# Patient Record
Sex: MALE | Race: WHITE | NOT HISPANIC OR LATINO | Employment: UNEMPLOYED | URBAN - METROPOLITAN AREA
[De-identification: names, ages, dates, MRNs, and addresses within clinical notes are randomized per-mention and may not be internally consistent; named-entity substitution may affect disease eponyms.]

---

## 2023-07-13 ENCOUNTER — APPOINTMENT (OUTPATIENT)
Dept: PRIMARY CARE | Facility: CLINIC | Age: 38
End: 2023-07-13
Payer: COMMERCIAL

## 2023-09-12 PROBLEM — R10.33 UMBILICAL PAIN: Status: ACTIVE | Noted: 2023-09-12

## 2023-09-12 PROBLEM — K42.9 UMBILICAL HERNIA: Status: ACTIVE | Noted: 2023-09-12

## 2023-09-12 PROBLEM — H52.213 IRREGULAR ASTIGMATISM OF BOTH EYES: Status: ACTIVE | Noted: 2023-09-12

## 2023-09-12 PROBLEM — R00.0 TACHYCARDIA: Status: ACTIVE | Noted: 2023-09-12

## 2023-09-12 PROBLEM — K64.9 HEMORRHOID: Status: ACTIVE | Noted: 2023-09-12

## 2023-09-12 PROBLEM — H02.821 CYST OF RIGHT UPPER EYELID: Status: ACTIVE | Noted: 2023-09-12

## 2023-09-12 PROBLEM — H18.613 KERATOCONUS, STABLE, BILATERAL: Status: ACTIVE | Noted: 2023-09-12

## 2023-09-12 PROBLEM — G43.909 MIGRAINE HEADACHE: Status: ACTIVE | Noted: 2023-09-12

## 2023-09-12 PROBLEM — K21.9 GERD WITHOUT ESOPHAGITIS: Status: ACTIVE | Noted: 2023-09-12

## 2023-09-12 PROBLEM — H18.622: Status: ACTIVE | Noted: 2023-09-12

## 2023-09-12 PROBLEM — Z87.19 HISTORY OF DIVERTICULITIS: Status: ACTIVE | Noted: 2023-09-12

## 2023-09-12 PROBLEM — Q87.2: Status: ACTIVE | Noted: 2023-09-12

## 2023-09-12 PROBLEM — H52.13 BILATERAL MYOPIA: Status: ACTIVE | Noted: 2023-09-12

## 2023-09-12 PROBLEM — E78.5 HYPERLIPIDEMIA: Status: ACTIVE | Noted: 2023-09-12

## 2023-09-12 PROBLEM — R03.0 ELEVATED BLOOD PRESSURE READING WITHOUT DIAGNOSIS OF HYPERTENSION: Status: ACTIVE | Noted: 2023-09-12

## 2023-09-12 RX ORDER — PROPRANOLOL HYDROCHLORIDE 20 MG/1
20 TABLET ORAL SEE ADMIN INSTRUCTIONS
COMMUNITY
Start: 2020-11-10 | End: 2023-10-04

## 2023-09-12 RX ORDER — MULTIVITAMIN
1 TABLET ORAL DAILY
COMMUNITY
Start: 2020-11-16

## 2023-09-12 RX ORDER — ACETAMINOPHEN 500 MG
1 TABLET ORAL DAILY
COMMUNITY
Start: 2020-11-16

## 2023-09-12 RX ORDER — OMEPRAZOLE 40 MG/1
40 CAPSULE, DELAYED RELEASE ORAL
COMMUNITY
Start: 2021-09-30 | End: 2023-10-04

## 2023-09-12 RX ORDER — SUMATRIPTAN 50 MG/1
50 TABLET, FILM COATED ORAL ONCE AS NEEDED
COMMUNITY
Start: 2020-11-10

## 2023-09-12 RX ORDER — PRAVASTATIN SODIUM 40 MG/1
1 TABLET ORAL DAILY
COMMUNITY
Start: 2020-11-10 | End: 2023-11-27

## 2023-09-12 RX ORDER — PROPRANOLOL HYDROCHLORIDE 20 MG/1
40 TABLET ORAL DAILY
COMMUNITY
End: 2023-09-14 | Stop reason: SDUPTHER

## 2023-09-14 ENCOUNTER — OFFICE VISIT (OUTPATIENT)
Dept: PRIMARY CARE | Facility: CLINIC | Age: 38
End: 2023-09-14
Payer: COMMERCIAL

## 2023-09-14 VITALS
SYSTOLIC BLOOD PRESSURE: 130 MMHG | DIASTOLIC BLOOD PRESSURE: 90 MMHG | BODY MASS INDEX: 31.38 KG/M2 | WEIGHT: 206.4 LBS | HEART RATE: 85 BPM | OXYGEN SATURATION: 98 %

## 2023-09-14 DIAGNOSIS — Z13.29 SCREENING FOR THYROID DISORDER: ICD-10-CM

## 2023-09-14 DIAGNOSIS — Z13.6 ENCOUNTER FOR SCREENING FOR CARDIOVASCULAR DISORDERS: ICD-10-CM

## 2023-09-14 DIAGNOSIS — Z00.00 WELLNESS EXAMINATION: Primary | ICD-10-CM

## 2023-09-14 DIAGNOSIS — Z13.0 SCREENING FOR DEFICIENCY ANEMIA: ICD-10-CM

## 2023-09-14 DIAGNOSIS — K58.0 IRRITABLE BOWEL SYNDROME WITH DIARRHEA: ICD-10-CM

## 2023-09-14 PROBLEM — F41.9 ANXIETY: Status: ACTIVE | Noted: 2018-11-05

## 2023-09-14 PROBLEM — E83.52 HYPERCALCEMIA: Status: ACTIVE | Noted: 2019-10-03

## 2023-09-14 PROBLEM — Z90.49 HISTORY OF APPENDECTOMY: Status: ACTIVE | Noted: 2018-11-05

## 2023-09-14 PROBLEM — R10.9 ABDOMINAL PAIN: Status: ACTIVE | Noted: 2019-10-03

## 2023-09-14 PROBLEM — R94.5 ABNORMAL LIVER FUNCTION: Status: ACTIVE | Noted: 2019-05-07

## 2023-09-14 PROBLEM — R00.0 SINUS TACHYCARDIA: Status: ACTIVE | Noted: 2018-11-05

## 2023-09-14 PROBLEM — K86.9 DISORDER OF PANCREATIC DUCT (HHS-HCC): Status: ACTIVE | Noted: 2019-05-23

## 2023-09-14 PROBLEM — G43.909 MIGRAINE: Status: ACTIVE | Noted: 2018-11-05

## 2023-09-14 PROCEDURE — 1036F TOBACCO NON-USER: CPT | Performed by: STUDENT IN AN ORGANIZED HEALTH CARE EDUCATION/TRAINING PROGRAM

## 2023-09-14 PROCEDURE — 99395 PREV VISIT EST AGE 18-39: CPT | Performed by: STUDENT IN AN ORGANIZED HEALTH CARE EDUCATION/TRAINING PROGRAM

## 2023-09-14 RX ORDER — METOPROLOL TARTRATE 50 MG/1
25 TABLET ORAL 2 TIMES DAILY
COMMUNITY
Start: 2017-06-01 | End: 2023-10-16

## 2023-09-14 RX ORDER — DULOXETIN HYDROCHLORIDE 20 MG/1
20 CAPSULE, DELAYED RELEASE ORAL 2 TIMES DAILY
COMMUNITY
End: 2023-10-16

## 2023-09-14 RX ORDER — MINERAL OIL
180 ENEMA (ML) RECTAL DAILY
COMMUNITY

## 2023-09-14 RX ORDER — LORAZEPAM 1 MG/1
1 TABLET ORAL 3 TIMES DAILY
COMMUNITY
Start: 2017-06-01 | End: 2023-10-16

## 2023-09-14 RX ORDER — CETIRIZINE HYDROCHLORIDE 10 MG/1
10 TABLET ORAL DAILY
COMMUNITY
End: 2024-04-23 | Stop reason: ALTCHOICE

## 2023-09-14 RX ORDER — HYDROXYZINE HYDROCHLORIDE 25 MG/1
25 TABLET, FILM COATED ORAL
COMMUNITY
End: 2023-10-16

## 2023-09-14 ASSESSMENT — PAIN SCALES - GENERAL: PAINLEVEL: 0-NO PAIN

## 2023-09-14 ASSESSMENT — ENCOUNTER SYMPTOMS: DEPRESSION: 0

## 2023-09-14 NOTE — PROGRESS NOTES
Subjective   Patient ID: Freddy Guadalupe is a 38 y.o. male who presents for Annual Exam (He is not fasting.).    HPI comes in for wellness visit    Review of Systems  Constitutional: NO F, chills, or sweats  Eyes: no blurred vision or visual disturbance  ENT: no hearing loss, no congestion, no nasal discharge, no hoarseness and no sore throat.   Cardiovascular: no chest pain, no edema, no palps and no syncope.   Respiratory: no cough,no s.o.b. and no wheezing  Gastrointestinal: no abdominal pain, No C/D no N/V, no blood in stools  Genitourinary: no dysuria, no change in urinary frequency, no urinary hesitancy and no feelings of urinary urgency.   Musculoskeletal: no arthralgias,  no back pain and no myalgias.   Integumentary: no new skin lesions and no rashes.   Neurological: no difficulty walking, no headache, no limb weakness, no numbness and no tingling.   Psychiatric: no anxiety, no depression, no anhedonia and no substance use disorders.   Endocrine: no recent weight gain and no recent weight loss.   Hematologic/Lymphatic: no tendency for easy bruising and no swollen glands.  Objective   /90 (BP Location: Left arm, Patient Position: Sitting, BP Cuff Size: Large adult)   Pulse 85   Wt 93.6 kg (206 lb 6.4 oz)   SpO2 98%   BMI 31.38 kg/m²     Physical Exam  gen- a & o x 3, nad, pleasant  heent- eomi, perrla, ear canals patent, TM's non-erythematous, no fluid, frontal and maxillary sinus's nontender  neck- supple, nontender, no palpable or enlarged nodes, no thyromegaly  heart- rrr, no murmurs  lungs- cta b/l , no w/r/r  chest- symmetric, nontender  ab- soft, nontender, no palpable organomegaly, postive bowel sounds  ex's- no c/c/e  neuro- CNs 2-12 grossly intact, full sensation and strength in all extremities    Assessment/Plan     1.  Wellness visit.  No concerns on exam.  Screening labs ordered today please be fasting.  Continue current meds.    2.  IBS symptoms since heavy dose of antibiotics in the  past.  Advised on trial of Xifaxan.

## 2023-09-14 NOTE — PATIENT INSTRUCTIONS
1.  Wellness visit.  No concerns on exam.  Screening labs ordered today please be fasting.  Continue current meds.    2.  IBS symptoms since heavy dose of antibiotics in the past.  Advised on trial of Xifaxan.

## 2023-10-04 DIAGNOSIS — F41.9 ANXIETY: ICD-10-CM

## 2023-10-04 DIAGNOSIS — K21.9 GERD WITHOUT ESOPHAGITIS: Primary | ICD-10-CM

## 2023-10-04 RX ORDER — OMEPRAZOLE 40 MG/1
CAPSULE, DELAYED RELEASE ORAL
Qty: 90 CAPSULE | Refills: 3 | Status: SHIPPED | OUTPATIENT
Start: 2023-10-04

## 2023-10-04 RX ORDER — PROPRANOLOL HYDROCHLORIDE 20 MG/1
TABLET ORAL
Qty: 270 TABLET | Refills: 3 | Status: SHIPPED | OUTPATIENT
Start: 2023-10-04 | End: 2024-04-23 | Stop reason: SDUPTHER

## 2023-10-16 ENCOUNTER — OFFICE VISIT (OUTPATIENT)
Dept: OPHTHALMOLOGY | Facility: CLINIC | Age: 38
End: 2023-10-16
Payer: COMMERCIAL

## 2023-10-16 DIAGNOSIS — H18.622 KERATOCONUS, UNSTABLE, LEFT EYE: ICD-10-CM

## 2023-10-16 DIAGNOSIS — H52.213 IRREGULAR ASTIGMATISM OF BOTH EYES: Primary | ICD-10-CM

## 2023-10-16 LAB
KMAX (OD): 47.8 DIOPTERS
KMAX (OS): 58.7 DIOPTERS
PACH THINNEST (OD): 528 MICRONS
PACH THINNEST (OS): 490 MICRONS
SIMK FLAT (OD): 42.7 DIOPTERS
SIMK FLAT (OS): 41.4 DIOPTERS
SIMK STEEP (OD): 44.9 DIOPTERS
SIMK STEEP (OS): 44.7 DIOPTERS

## 2023-10-16 PROCEDURE — 92025 CPTRIZED CORNEAL TOPOGRAPHY: CPT | Performed by: OPHTHALMOLOGY

## 2023-10-16 PROCEDURE — 99213 OFFICE O/P EST LOW 20 MIN: CPT | Performed by: OPHTHALMOLOGY

## 2023-10-16 ASSESSMENT — ENCOUNTER SYMPTOMS
CONSTITUTIONAL NEGATIVE: 0
GASTROINTESTINAL NEGATIVE: 0
EYES NEGATIVE: 0
ENDOCRINE NEGATIVE: 0
RESPIRATORY NEGATIVE: 0
CARDIOVASCULAR NEGATIVE: 0
MUSCULOSKELETAL NEGATIVE: 0
ALLERGIC/IMMUNOLOGIC NEGATIVE: 0
NEUROLOGICAL NEGATIVE: 0
HEMATOLOGIC/LYMPHATIC NEGATIVE: 0
PSYCHIATRIC NEGATIVE: 0

## 2023-10-16 ASSESSMENT — VISUAL ACUITY
METHOD: SNELLEN - LINEAR
OS_CC: 20/20
OS_CC+: -2
CORRECTION_TYPE: CONTACTS
OD_CC: 20/20

## 2023-10-16 NOTE — PROGRESS NOTES
Assessment/Plan   Diagnoses and all orders for this visit:  Irregular astigmatism of both eyes  -     Corneal Topography - OU - Both Eyes  Keratoconus, unstable, left eye  No progression seen today on pentacam  Defer CXL    Pt would like to get INTACs    1 year for pentacam

## 2023-11-27 DIAGNOSIS — E78.5 HYPERLIPIDEMIA, UNSPECIFIED HYPERLIPIDEMIA TYPE: Primary | ICD-10-CM

## 2023-11-27 RX ORDER — OFLOXACIN 3 MG/ML
SOLUTION/ DROPS OPHTHALMIC
COMMUNITY
Start: 2023-11-21

## 2023-11-27 RX ORDER — PRAVASTATIN SODIUM 40 MG/1
40 TABLET ORAL DAILY
Qty: 90 TABLET | Refills: 3 | Status: SHIPPED | OUTPATIENT
Start: 2023-11-27

## 2023-11-27 RX ORDER — RIFAXIMIN 550 MG/1
TABLET ORAL
COMMUNITY
Start: 2023-11-10 | End: 2024-05-19 | Stop reason: SDUPTHER

## 2024-02-01 ENCOUNTER — APPOINTMENT (OUTPATIENT)
Dept: OPHTHALMOLOGY | Facility: CLINIC | Age: 39
End: 2024-02-01
Payer: COMMERCIAL

## 2024-03-05 ENCOUNTER — APPOINTMENT (OUTPATIENT)
Dept: PRIMARY CARE | Facility: CLINIC | Age: 39
End: 2024-03-05
Payer: COMMERCIAL

## 2024-03-06 ENCOUNTER — TELEMEDICINE (OUTPATIENT)
Dept: PRIMARY CARE | Facility: CLINIC | Age: 39
End: 2024-03-06
Payer: COMMERCIAL

## 2024-03-06 DIAGNOSIS — U07.1 COVID-19: Primary | ICD-10-CM

## 2024-03-06 PROCEDURE — 1036F TOBACCO NON-USER: CPT

## 2024-03-06 PROCEDURE — 99213 OFFICE O/P EST LOW 20 MIN: CPT

## 2024-03-06 RX ORDER — DEXAMETHASONE 6 MG/1
6 TABLET ORAL DAILY
Qty: 10 TABLET | Refills: 0 | Status: SHIPPED | OUTPATIENT
Start: 2024-03-06 | End: 2024-04-23 | Stop reason: ALTCHOICE

## 2024-03-06 ASSESSMENT — ENCOUNTER SYMPTOMS
HOARSE VOICE: 1
SORE THROAT: 1

## 2024-03-07 NOTE — PROGRESS NOTES
This visit was completed via video conference. All issues as below were discussed and addressed but no physical exam was performed. If it was felt that the patient should be evaluated in clinic than they were directed there. The patient verbally consented to the visit.    Subjective   Patient ID: Freddy Guadalupe is a 38 y.o. male who presents for Sore Throat and covid.  Sore Throat   This is a new problem. The current episode started in the past 7 days. The problem has been rapidly worsening. Neither side of throat is experiencing more pain than the other. The maximum temperature recorded prior to his arrival was 102 - 102.9 F. The fever has been present for 3 to 4 days. The pain is at a severity of 9/10. The pain is moderate. Associated symptoms include a hoarse voice. He has had exposure to strep.   URI   This is a new problem. The problem has been gradually worsening. The maximum temperature recorded prior to his arrival was 102 - 102.9 F. The fever has been present for 1 to 2 days. Associated symptoms include a sore throat. He has tried NSAIDs for the symptoms.      Pt was at urgent care this am after having sore throat x4 days had home tested for covid twice both negative, was strep positive at urgent care.  At home did another covid test at the request of the urgent care who did not have the needed equip to do testing on site and it was positive. Attempted to call the urgent care but they have gone for the day.    Will tx the covid with a round of steroids  Review of Systems   HENT:  Positive for hoarse voice and sore throat.        Objective     There were no vitals taken for this visit.       Physical Exam pt seen from his home to be in no acute distress    Assessment/Plan   Problem List Items Addressed This Visit    None  Visit Diagnoses         Codes    COVID-19    -  Primary U07.1    Relevant Medications    dexAMETHasone (Decadron) 6 mg tablet

## 2024-04-20 ENCOUNTER — APPOINTMENT (OUTPATIENT)
Dept: RADIOLOGY | Facility: HOSPITAL | Age: 39
End: 2024-04-20
Payer: COMMERCIAL

## 2024-04-20 ENCOUNTER — CLINICAL SUPPORT (OUTPATIENT)
Dept: EMERGENCY MEDICINE | Facility: HOSPITAL | Age: 39
End: 2024-04-20
Payer: COMMERCIAL

## 2024-04-20 ENCOUNTER — HOSPITAL ENCOUNTER (EMERGENCY)
Facility: HOSPITAL | Age: 39
Discharge: HOME | End: 2024-04-20
Payer: COMMERCIAL

## 2024-04-20 VITALS
RESPIRATION RATE: 16 BRPM | HEART RATE: 96 BPM | WEIGHT: 195 LBS | TEMPERATURE: 98.6 F | OXYGEN SATURATION: 97 % | SYSTOLIC BLOOD PRESSURE: 158 MMHG | BODY MASS INDEX: 29.55 KG/M2 | HEIGHT: 68 IN | DIASTOLIC BLOOD PRESSURE: 105 MMHG

## 2024-04-20 DIAGNOSIS — I47.9 TACHYCARDIA, PAROXYSMAL (MULTI): Primary | ICD-10-CM

## 2024-04-20 LAB
ANION GAP SERPL CALC-SCNC: 15 MMOL/L (ref 10–20)
BASOPHILS # BLD AUTO: 0.04 X10*3/UL (ref 0–0.1)
BASOPHILS NFR BLD AUTO: 0.4 %
BNP SERPL-MCNC: 3 PG/ML (ref 0–99)
BUN SERPL-MCNC: 15 MG/DL (ref 6–23)
CALCIUM SERPL-MCNC: 10.3 MG/DL (ref 8.6–10.6)
CARDIAC TROPONIN I PNL SERPL HS: <3 NG/L (ref 0–53)
CHLORIDE SERPL-SCNC: 103 MMOL/L (ref 98–107)
CO2 SERPL-SCNC: 23 MMOL/L (ref 21–32)
CREAT SERPL-MCNC: 0.8 MG/DL (ref 0.5–1.3)
EGFRCR SERPLBLD CKD-EPI 2021: >90 ML/MIN/1.73M*2
EOSINOPHIL # BLD AUTO: 0.06 X10*3/UL (ref 0–0.7)
EOSINOPHIL NFR BLD AUTO: 0.6 %
ERYTHROCYTE [DISTWIDTH] IN BLOOD BY AUTOMATED COUNT: 12.8 % (ref 11.5–14.5)
GLUCOSE SERPL-MCNC: 98 MG/DL (ref 74–99)
HCT VFR BLD AUTO: 46.3 % (ref 41–52)
HETEROPH AB SERPLBLD QL IA.RAPID: NEGATIVE
HGB BLD-MCNC: 16.5 G/DL (ref 13.5–17.5)
IMM GRANULOCYTES # BLD AUTO: 0.02 X10*3/UL (ref 0–0.7)
IMM GRANULOCYTES NFR BLD AUTO: 0.2 % (ref 0–0.9)
LYMPHOCYTES # BLD AUTO: 2.35 X10*3/UL (ref 1.2–4.8)
LYMPHOCYTES NFR BLD AUTO: 24.4 %
MCH RBC QN AUTO: 28.9 PG (ref 26–34)
MCHC RBC AUTO-ENTMCNC: 35.6 G/DL (ref 32–36)
MCV RBC AUTO: 81 FL (ref 80–100)
MONOCYTES # BLD AUTO: 0.53 X10*3/UL (ref 0.1–1)
MONOCYTES NFR BLD AUTO: 5.5 %
NEUTROPHILS # BLD AUTO: 6.62 X10*3/UL (ref 1.2–7.7)
NEUTROPHILS NFR BLD AUTO: 68.9 %
NRBC BLD-RTO: 0 /100 WBCS (ref 0–0)
PLATELET # BLD AUTO: 254 X10*3/UL (ref 150–450)
POTASSIUM SERPL-SCNC: 4.1 MMOL/L (ref 3.5–5.3)
RBC # BLD AUTO: 5.71 X10*6/UL (ref 4.5–5.9)
SARS-COV-2 RNA RESP QL NAA+PROBE: NOT DETECTED
SODIUM SERPL-SCNC: 137 MMOL/L (ref 136–145)
WBC # BLD AUTO: 9.6 X10*3/UL (ref 4.4–11.3)

## 2024-04-20 PROCEDURE — 96360 HYDRATION IV INFUSION INIT: CPT

## 2024-04-20 PROCEDURE — 2550000001 HC RX 255 CONTRASTS: Performed by: PHYSICIAN ASSISTANT

## 2024-04-20 PROCEDURE — 36415 COLL VENOUS BLD VENIPUNCTURE: CPT | Performed by: PHYSICIAN ASSISTANT

## 2024-04-20 PROCEDURE — 71046 X-RAY EXAM CHEST 2 VIEWS: CPT | Performed by: RADIOLOGY

## 2024-04-20 PROCEDURE — 86308 HETEROPHILE ANTIBODY SCREEN: CPT | Performed by: PHYSICIAN ASSISTANT

## 2024-04-20 PROCEDURE — 99285 EMERGENCY DEPT VISIT HI MDM: CPT

## 2024-04-20 PROCEDURE — 83880 ASSAY OF NATRIURETIC PEPTIDE: CPT | Performed by: PHYSICIAN ASSISTANT

## 2024-04-20 PROCEDURE — 99285 EMERGENCY DEPT VISIT HI MDM: CPT | Performed by: PHYSICIAN ASSISTANT

## 2024-04-20 PROCEDURE — 71275 CT ANGIOGRAPHY CHEST: CPT

## 2024-04-20 PROCEDURE — 87635 SARS-COV-2 COVID-19 AMP PRB: CPT | Performed by: PHYSICIAN ASSISTANT

## 2024-04-20 PROCEDURE — 84484 ASSAY OF TROPONIN QUANT: CPT | Performed by: PHYSICIAN ASSISTANT

## 2024-04-20 PROCEDURE — 2500000004 HC RX 250 GENERAL PHARMACY W/ HCPCS (ALT 636 FOR OP/ED): Performed by: PHYSICIAN ASSISTANT

## 2024-04-20 PROCEDURE — 93005 ELECTROCARDIOGRAM TRACING: CPT

## 2024-04-20 PROCEDURE — 71046 X-RAY EXAM CHEST 2 VIEWS: CPT

## 2024-04-20 PROCEDURE — 71275 CT ANGIOGRAPHY CHEST: CPT | Performed by: RADIOLOGY

## 2024-04-20 PROCEDURE — 80048 BASIC METABOLIC PNL TOTAL CA: CPT | Performed by: PHYSICIAN ASSISTANT

## 2024-04-20 PROCEDURE — 85025 COMPLETE CBC W/AUTO DIFF WBC: CPT | Performed by: PHYSICIAN ASSISTANT

## 2024-04-20 RX ADMIN — SODIUM CHLORIDE 1000 ML: 9 INJECTION, SOLUTION INTRAVENOUS at 19:34

## 2024-04-20 RX ADMIN — IOHEXOL 71 ML: 350 INJECTION, SOLUTION INTRAVENOUS at 18:20

## 2024-04-20 ASSESSMENT — LIFESTYLE VARIABLES
HAVE PEOPLE ANNOYED YOU BY CRITICIZING YOUR DRINKING: NO
EVER FELT BAD OR GUILTY ABOUT YOUR DRINKING: NO
TOTAL SCORE: 0
EVER HAD A DRINK FIRST THING IN THE MORNING TO STEADY YOUR NERVES TO GET RID OF A HANGOVER: NO
HAVE YOU EVER FELT YOU SHOULD CUT DOWN ON YOUR DRINKING: NO

## 2024-04-20 ASSESSMENT — COLUMBIA-SUICIDE SEVERITY RATING SCALE - C-SSRS
6. HAVE YOU EVER DONE ANYTHING, STARTED TO DO ANYTHING, OR PREPARED TO DO ANYTHING TO END YOUR LIFE?: NO
2. HAVE YOU ACTUALLY HAD ANY THOUGHTS OF KILLING YOURSELF?: NO
1. IN THE PAST MONTH, HAVE YOU WISHED YOU WERE DEAD OR WISHED YOU COULD GO TO SLEEP AND NOT WAKE UP?: NO

## 2024-04-20 NOTE — ED PROVIDER NOTES
HPI   Chief Complaint   Patient presents with    Chest Pain    Shortness of Breath       HPI:Patient is a 38 year old male with a history of HLD, Klippel-Trenauany syndrome who presents to the ED for increased heart rate.  Patient states that for the past several days he has been having increased shortness of breath and increased heart rate with very little exertion.  States that he has been watching his Apple Watch and anytime he walks around his heart rate will go up to 105.  States when he goes upstairs he will have a heart rate in the 130s.  States that when this occurs he has a pressure sensation in his chest that feels like a balloon.  States that this radiates into his neck.  He notes occasional mild chest pain that he rates a 2 out of 10 in severity on the left side of his chest as well.  He notes that a month ago he did get diagnosed with COVID and strep throat at the same time.  He states that he has a rare vascular condition that does make him prone to blood clots and was concern for PE so he came to the ED after being prompted by his family.  He denies any cough, hemoptysis, lower extremity swelling, recent travel, recent hospital stays, medication changes.  Notes that he is on propranolol for migraine HA.   ------------------------------------------------------------------------------------------------------------------------------------------  ROS: a ten point review of systems was performed and was negative except as per HPI.  ------------------------------------------------------------------------------------------------------------------------------------------  PMH / PSH: as per HPI, otherwise reviewed   MEDS: as per HPI, otherwise reviewed in EMR  ALLERGIES: as per HPI, otherwise reviewed in EMR  SocH:  as per HPI, otherwise reviewed in EMR  FH:  as per HPI, otherwise reviewed in EMR    ------------------------------------------------------------------------------------------------------------------------------------------  Physical Exam:  VS: As documented in the triage note and EMR flowsheet from this visit was reviewed  General: Well appearing. No acute distress.   Eyes:  Extraocular movements grossly intact. No scleral icterus.   Head: Atraumatic. Normocephalic.     Neck: No meningismus. No gross masses. Full movement through range of motion  CV: Regular rhythm. No murmurs, rubs, gallops appreciated.   Resp: Clear to auscultation bilaterally. No respiratory distress.    GI: Nontender. Soft. No masses. No rebound, rigidity or guarding.   MSK: Symmetric muscle bulk. No gross step offs or deformities.  Skin: Warm, dry. No rashes  Neuro: CN II-VII intact. A&O x3. Speech fluent. Alert. Moving all extremities. Ambulates with normal gait  Psych: Appropriate mood and affect for situation  ------------------------------------------------------------------------------------------------------------------------------------------  Hospital Course / Medical Decision Making: Patient is a 38-year-old male who presents to the ED for increased heart rate, chest pain and shortness of breath.  On examination, patient well-appearing.  Vitals notable for hypertension, otherwise stable.  States that he has been becoming short of breath and tachycardic with little exertion.  Notes occasional mild chest pain.  States that he has a rare vascular condition that makes him prone to blood clots so he was concern for PE.  His EKG shows normal sinus rhythm with arrhythmia at 81 bpm, normal axis, no STEMI.  Differential diagnosis includes but is not limited to PE versus NSTEMI versus pneumonia versus long COVID versus anemia versus mononucleosis.  Chest x-ray showed no evidence of cardiopulmonary abnormality.  CBC without leukocytosis or anemia.  BMP without electrolyte or renal abnormality.  BNP and troponin within normal  range.  COVID screen negative.  CT of the chest was obtained given patient's vascular condition and was negative for PE.  Patient was administered a liter of fluids while he was here.  States that his tachycardia with exertion was markedly improved after his liter of fluids.  His heart score is 1. Risk for future cardiac event is low. States that he has follow up with his PCP next week.  Discussed observation versus discharge.  Patient would like to be discharged at this time. Patient has remained hemodynamically stable throughout the course of their ED stay.  Patient is home-going.  Patient advised to return to the ED for any worsening symptoms.  Advised to follow-up with PCP.  Patient was discharged in stable condition.                              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 ([facility])     Klippel Trenaunay syndrome    Diverticulitis of intestine, part unspecified, without perforation or abscess without bleeding 04/15/2022    Acute diverticulitis    Keratoconus, unspecified, unspecified eye 11/10/2020    Keratoconus, unspecified laterality    Migraine with aura, not intractable, without status migrainosus 11/10/2020    Migraine with aura and without status migrainosus, not intractable    Noninfective gastroenteritis and colitis, unspecified 05/11/2022    Diarrhea, secretory    Personal history of other diseases of the digestive system 05/11/2022    History of hemorrhoids    Personal history of other specified conditions 05/12/2022    History of diarrhea    Personal history of other specified conditions 04/15/2022    History of abdominal pain    Personal history of other specified conditions 03/31/2021    History of tachycardia     Past Surgical History:   Procedure Laterality Date    OTHER SURGICAL HISTORY  11/16/2020    Pyloromyotomy     Family History   Problem Relation  Name Age of Onset    Other (malignant melanoma) Mother          basal cell skin cancer    Other (axonal gulillain-barre syndrome) Father       Social History     Tobacco Use    Smoking status: Never    Smokeless tobacco: Never   Substance Use Topics    Alcohol use: Not Currently    Drug use: Never       Physical Exam   ED Triage Vitals [04/20/24 1419]   Temperature Heart Rate Respirations BP   37 °C (98.6 °F) 96 16 (!) 158/105      Pulse Ox Temp src Heart Rate Source Patient Position   97 % -- -- --      BP Location FiO2 (%)     -- --       Physical Exam    ED Course & MDM   Diagnoses as of 04/20/24 2134   Tachycardia, paroxysmal (Multi)       Medical Decision Making      Procedure  Procedures     Crystal Canchola PA-C  04/20/24 2138

## 2024-04-20 NOTE — ED TRIAGE NOTES
Pt to ED for CP, and SOB x several days. States he has been becoming tachycardic with activity. Had Covid and Strep 1 month ago.

## 2024-04-21 LAB
ATRIAL RATE: 81 BPM
P AXIS: 51 DEGREES
P OFFSET: 192 MS
P ONSET: 144 MS
PR INTERVAL: 146 MS
Q ONSET: 217 MS
QRS COUNT: 14 BEATS
QRS DURATION: 84 MS
QT INTERVAL: 350 MS
QTC CALCULATION(BAZETT): 406 MS
QTC FREDERICIA: 386 MS
R AXIS: 66 DEGREES
T AXIS: 36 DEGREES
T OFFSET: 392 MS
VENTRICULAR RATE: 81 BPM

## 2024-04-23 ENCOUNTER — OFFICE VISIT (OUTPATIENT)
Dept: PRIMARY CARE | Facility: CLINIC | Age: 39
End: 2024-04-23
Payer: COMMERCIAL

## 2024-04-23 VITALS
OXYGEN SATURATION: 99 % | SYSTOLIC BLOOD PRESSURE: 146 MMHG | BODY MASS INDEX: 31.02 KG/M2 | HEART RATE: 95 BPM | DIASTOLIC BLOOD PRESSURE: 90 MMHG | WEIGHT: 204 LBS

## 2024-04-23 DIAGNOSIS — R00.2 INTERMITTENT PALPITATIONS: Primary | ICD-10-CM

## 2024-04-23 DIAGNOSIS — F41.9 ANXIETY: ICD-10-CM

## 2024-04-23 DIAGNOSIS — R00.0 TACHYCARDIA: ICD-10-CM

## 2024-04-23 PROBLEM — K76.89 HEPATIC DYSFUNCTION: Status: ACTIVE | Noted: 2019-05-07

## 2024-04-23 PROBLEM — L23.1 ALLERGIC CONTACT DERMATITIS DUE TO ADHESIVES: Status: ACTIVE | Noted: 2024-04-23

## 2024-04-23 PROBLEM — I47.9 PAROXYSMAL TACHYCARDIA (MULTI): Status: ACTIVE | Noted: 2018-11-05

## 2024-04-23 PROBLEM — U07.1 DISEASE DUE TO SEVERE ACUTE RESPIRATORY SYNDROME CORONAVIRUS 2 (SARS-COV-2): Status: ACTIVE | Noted: 2024-04-23

## 2024-04-23 PROBLEM — K64.9 HEMORRHOIDS: Status: ACTIVE | Noted: 2020-11-17

## 2024-04-23 PROBLEM — K58.0 IRRITABLE BOWEL SYNDROME WITH DIARRHEA: Status: ACTIVE | Noted: 2024-04-23

## 2024-04-23 PROCEDURE — 99213 OFFICE O/P EST LOW 20 MIN: CPT | Performed by: STUDENT IN AN ORGANIZED HEALTH CARE EDUCATION/TRAINING PROGRAM

## 2024-04-23 PROCEDURE — 1036F TOBACCO NON-USER: CPT | Performed by: STUDENT IN AN ORGANIZED HEALTH CARE EDUCATION/TRAINING PROGRAM

## 2024-04-23 RX ORDER — DEXTROAMPHETAMINE SACCHARATE, AMPHETAMINE ASPARTATE, DEXTROAMPHETAMINE SULFATE AND AMPHETAMINE SULFATE 1.25; 1.25; 1.25; 1.25 MG/1; MG/1; MG/1; MG/1
1 TABLET ORAL DAILY
COMMUNITY
Start: 2024-04-18

## 2024-04-23 RX ORDER — PROPRANOLOL HYDROCHLORIDE 20 MG/1
20 TABLET ORAL 2 TIMES DAILY
Qty: 180 TABLET | Refills: 3 | Status: SHIPPED | OUTPATIENT
Start: 2024-04-23 | End: 2025-04-23

## 2024-04-23 RX ORDER — HYDROXYZINE HYDROCHLORIDE 25 MG/1
TABLET, FILM COATED ORAL
COMMUNITY

## 2024-04-23 RX ORDER — ALPRAZOLAM 0.5 MG/1
0.5 TABLET ORAL 2 TIMES DAILY PRN
COMMUNITY
Start: 2024-04-18

## 2024-04-23 ASSESSMENT — PAIN SCALES - GENERAL: PAINLEVEL: 0-NO PAIN

## 2024-04-23 NOTE — PATIENT INSTRUCTIONS
1.  Intermittent tachycardia palpitations past several weeks.  ER follow-up there is no concerns for DVT or PE on CT chest.  No concerns on EKG.  He is responding well to propranolol twice per day.  However due to symptoms we would advise on a Holter monitor as well as tilt table testing.  Do not take propranolol the day of tilt table testing.    Andrew Cordero DO  for questions and f/u please call office   [place] [phone] [facility] [phone]  any forms needed please fax   [place] [phone] [facility] [phone]  for Physical therapy orders can call [phone]  for Radiology orders can call [phone]  for general referrals can call [phone]  for cardiac testing can call [phone]  for GI testing call [phone]

## 2024-04-23 NOTE — PROGRESS NOTES
Subjective   Patient ID: Freddy Guadalupe is a 38 y.o. male who presents for ER Follow-up (Chest pain / tachycardia).    HPI comes in with intermittent flareups of palpitations for the past several weeks.  Went to the ER negative workup    Review of Systems  Constitutional: NO F, chills, or sweats  Eyes: no blurred vision or visual disturbance  ENT: no hearing loss, no congestion, no nasal discharge, no hoarseness and no sore throat.   Cardiovascular: no chest pain, no edema, positive palpitations tachycardia some lightheadedness  Respiratory: no cough,no s.o.b. and no wheezing  Gastrointestinal: no abdominal pain, No C/D no N/V, no blood in stools  Genitourinary: no dysuria, no change in urinary frequency, no urinary hesitancy and no feelings of urinary urgency.   Musculoskeletal: no arthralgias,  no back pain and no myalgias.   Integumentary: no new skin lesions and no rashes.   Neurological: no difficulty walking, no headache, no limb weakness, no numbness and no tingling.     Objective   /90 (BP Location: Left arm, Patient Position: Sitting)   Pulse 95   Wt 92.5 kg (204 lb)   SpO2 99%   BMI 31.02 kg/m²     Physical Exam  gen- a & o x 3, nad, pleasant  heent- eomi, perrla, ear canals patent, TM's non-erythematous, no fluid, frontal and maxillary sinus's nontender  neck- supple, nontender, no palpable or enlarged nodes, no thyromegaly  heart- rrr, no murmurs  lungs- cta b/l , no w/r/r    Assessment/Plan     1.  Intermittent tachycardia palpitations past several weeks.  ER follow-up there is no concerns for DVT or PE on CT chest.  No concerns on EKG.  He is responding well to propranolol twice per day.  However due to symptoms we would advise on a Holter monitor as well as tilt table testing.  Do not take propranolol the day of tilt table testing.

## 2024-04-24 ENCOUNTER — HOSPITAL ENCOUNTER (OUTPATIENT)
Dept: CARDIOLOGY | Facility: HOSPITAL | Age: 39
Discharge: HOME | End: 2024-04-24
Payer: COMMERCIAL

## 2024-04-24 DIAGNOSIS — R00.2 INTERMITTENT PALPITATIONS: ICD-10-CM

## 2024-04-24 DIAGNOSIS — R00.0 TACHYCARDIA: ICD-10-CM

## 2024-04-24 DIAGNOSIS — F41.9 ANXIETY: ICD-10-CM

## 2024-04-24 PROCEDURE — 93246 EXT ECG>7D<15D RECORDING: CPT

## 2024-04-24 PROCEDURE — 93248 EXT ECG>7D<15D REV&INTERPJ: CPT | Performed by: INTERNAL MEDICINE

## 2024-04-25 ENCOUNTER — APPOINTMENT (OUTPATIENT)
Dept: PRIMARY CARE | Facility: CLINIC | Age: 39
End: 2024-04-25
Payer: COMMERCIAL

## 2024-04-29 ENCOUNTER — OFFICE VISIT (OUTPATIENT)
Dept: OPHTHALMOLOGY | Facility: CLINIC | Age: 39
End: 2024-04-29
Payer: COMMERCIAL

## 2024-04-29 DIAGNOSIS — H18.622 KERATOCONUS, UNSTABLE, LEFT EYE: Primary | ICD-10-CM

## 2024-04-29 LAB
KMAX (OD): 47.7 DIOPTERS
KMAX (OS): 53.9 DIOPTERS
PACH THINNEST (OD): 523 MICRONS
PACH THINNEST (OS): 490 MICRONS
SIMK FLAT (OD): 42.8 DIOPTERS
SIMK FLAT (OS): 38.9 DIOPTERS
SIMK STEEP (OD): 44.8 DIOPTERS
SIMK STEEP (OS): 41.1 DIOPTERS

## 2024-04-29 PROCEDURE — 99213 OFFICE O/P EST LOW 20 MIN: CPT | Performed by: OPHTHALMOLOGY

## 2024-04-29 PROCEDURE — 92025 CPTRIZED CORNEAL TOPOGRAPHY: CPT | Performed by: OPHTHALMOLOGY

## 2024-04-29 ASSESSMENT — EXTERNAL EXAM - RIGHT EYE: OD_EXAM: NORMAL

## 2024-04-29 ASSESSMENT — TONOMETRY
OD_IOP_MMHG: 10
IOP_METHOD: GOLDMANN APPLANATION
OS_IOP_MMHG: 20

## 2024-04-29 ASSESSMENT — VISUAL ACUITY
CORRECTION_TYPE: CONTACTS
METHOD: SNELLEN - LINEAR
OS_PH_CC: 20/25
OD_CC: 20/20
OS_CC: 20/50

## 2024-04-29 ASSESSMENT — SLIT LAMP EXAM - LIDS: COMMENTS: NORMAL

## 2024-04-29 ASSESSMENT — EXTERNAL EXAM - LEFT EYE: OS_EXAM: NORMAL

## 2024-04-29 ASSESSMENT — ENCOUNTER SYMPTOMS: EYES NEGATIVE: 1

## 2024-04-29 NOTE — PROGRESS NOTES
Assessment/Plan   Diagnoses and all orders for this visit:  Irregular astigmatism of both eyes  -     Corneal Topography - OU - Both Eyes  Keratoconus, unstable, left eye  Patient now s/p CTAK in lower half of left eye (Dr. Gomes)  Doing well  Discussed r/b/a of CXL OU - patient amenable  Will discuss with Dr. Gomes regarding safety profile of CXL in this clinical setting    6 months for pentacam - possible CXL

## 2024-05-01 ENCOUNTER — OFFICE VISIT (OUTPATIENT)
Dept: OPHTHALMOLOGY | Facility: CLINIC | Age: 39
End: 2024-05-01
Payer: COMMERCIAL

## 2024-05-01 ENCOUNTER — APPOINTMENT (OUTPATIENT)
Dept: NEUROLOGY | Facility: HOSPITAL | Age: 39
End: 2024-05-01
Payer: COMMERCIAL

## 2024-05-01 DIAGNOSIS — H52.213 IRREGULAR ASTIGMATISM OF BOTH EYES: Primary | ICD-10-CM

## 2024-05-01 DIAGNOSIS — H52.11 MYOPIA OF RIGHT EYE: ICD-10-CM

## 2024-05-01 DIAGNOSIS — H18.611 KERATOCONUS, STABLE, RIGHT EYE: ICD-10-CM

## 2024-05-01 DIAGNOSIS — H18.622 KERATOCONUS, UNSTABLE, LEFT EYE: ICD-10-CM

## 2024-05-01 DIAGNOSIS — H52.02 HYPERMETROPIA OF LEFT EYE: ICD-10-CM

## 2024-05-01 PROCEDURE — V2521 CNTCT LENS HYDROPHILIC TORIC: HCPCS | Performed by: OPTOMETRIST

## 2024-05-01 PROCEDURE — 92012 INTRM OPH EXAM EST PATIENT: CPT | Performed by: OPTOMETRIST

## 2024-05-01 PROCEDURE — 92015 DETERMINE REFRACTIVE STATE: CPT | Performed by: OPTOMETRIST

## 2024-05-01 PROCEDURE — 92310 CONTACT LENS FITTING OU: CPT | Performed by: OPTOMETRIST

## 2024-05-01 PROCEDURE — 1036F TOBACCO NON-USER: CPT | Performed by: OPTOMETRIST

## 2024-05-01 ASSESSMENT — REFRACTION_CURRENTRX
OS_AXIS: 110
OS_AXIS: 120
OS_BASECURVE: 8.6
OS_CYLINDER: -1.75
OS_DIAMETER: 14.5
OS_BRAND: MYDAY TORIC
OD_DIAMETER: 14.2
OS_BRAND: ACUVUE OASYS ASTIGMATISM
OD_DIAMETER: 14.0
OS_BASECURVE: 8.7
OS_SPHERE: -0.50
OS_CYLINDER: -1.75
OD_BRAND: ACUVUE OASYS
OD_CYLINDER: SPHERE
OD_SPHERE: -3.00
OD_SPHERE: -3.00
OD_BRAND: MYDAY
OD_BRAND: BIOFINITY ENERGYS
OS_DIAMETER: 14.5
OD_BASECURVE: 8.6
OD_DIAMETER: 14.0
OS_AXIS: 140
OD_BASECURVE: 8.4
OS_CYLINDER: -1.75
OS_SPHERE: +0.50
OD_BASECURVE: 8.4
OS_SPHERE: +0.50
OS_BRAND: BIOFINITY TORIC
OS_BASECURVE: 8.6
OS_DIAMETER: 14.5
OD_SPHERE: -2.75

## 2024-05-01 ASSESSMENT — VISUAL ACUITY
METHOD: SNELLEN - LINEAR
CORRECTION_TYPE: CONTACTS
VA_OR_OD_CURRENT_RX: 20/20-
VA_OR_OS_CURRENT_RX: 20/20-
OS_CC+: +2
OS_CC: 20/50
OD_CC: 20/20

## 2024-05-01 ASSESSMENT — REFRACTION_MANIFEST
OD_AXIS: 165
OS_CYLINDER: -2.00
OS_SPHERE: +0.50
OD_SPHERE: -2.75
OS_AXIS: 124
OD_CYLINDER: -0.50

## 2024-05-01 ASSESSMENT — CONF VISUAL FIELD
OS_SUPERIOR_NASAL_RESTRICTION: 0
OS_INFERIOR_NASAL_RESTRICTION: 0
OD_INFERIOR_NASAL_RESTRICTION: 0
METHOD: COUNTING FINGERS
OS_NORMAL: 1
OD_SUPERIOR_NASAL_RESTRICTION: 0
OD_INFERIOR_TEMPORAL_RESTRICTION: 0
OS_INFERIOR_TEMPORAL_RESTRICTION: 0
OD_NORMAL: 1
OD_SUPERIOR_TEMPORAL_RESTRICTION: 0
OS_SUPERIOR_TEMPORAL_RESTRICTION: 0

## 2024-05-01 ASSESSMENT — EXTERNAL EXAM - RIGHT EYE: OD_EXAM: NORMAL

## 2024-05-01 ASSESSMENT — ENCOUNTER SYMPTOMS
PSYCHIATRIC NEGATIVE: 0
GASTROINTESTINAL NEGATIVE: 0
CONSTITUTIONAL NEGATIVE: 0
EYES NEGATIVE: 1
RESPIRATORY NEGATIVE: 0
HEMATOLOGIC/LYMPHATIC NEGATIVE: 0
CARDIOVASCULAR NEGATIVE: 0
NEUROLOGICAL NEGATIVE: 0
ALLERGIC/IMMUNOLOGIC NEGATIVE: 0
MUSCULOSKELETAL NEGATIVE: 0
ENDOCRINE NEGATIVE: 0

## 2024-05-01 ASSESSMENT — SLIT LAMP EXAM - LIDS: COMMENTS: NORMAL

## 2024-05-01 ASSESSMENT — EXTERNAL EXAM - LEFT EYE: OS_EXAM: NORMAL

## 2024-05-01 NOTE — PROGRESS NOTES
Assessment/Plan   Diagnoses and all orders for this visit:  Irregular astigmatism of both eyes  Keratoconus, unstable, left eye  Keratoconus, stable, right eye  Myopia of right eye  Hypermetropia of left eye  New spec rx released today per patient request. Ocular health wnl for age OU. Monitor 1 year or sooner prn. Refraction billed today.  Discussed proper wear, care, replacement of contact lenses. Gave handout. D/c cl wear and RTC if eyes become red, painful, irritated. Monitor 1 year.   CL eval billed today. $35 with year supply of lenses as medically necessary.   Pt trialing myday/myday toric as acuvue oasys for astig not available in axis 120 for OS. If patient does not like can switch to biofinity lenses. Trials on order. Pt will call to confirm.

## 2024-05-03 ENCOUNTER — LAB (OUTPATIENT)
Dept: LAB | Facility: LAB | Age: 39
End: 2024-05-03
Payer: COMMERCIAL

## 2024-05-03 DIAGNOSIS — Z13.29 SCREENING FOR THYROID DISORDER: ICD-10-CM

## 2024-05-03 DIAGNOSIS — Z13.6 ENCOUNTER FOR SCREENING FOR CARDIOVASCULAR DISORDERS: ICD-10-CM

## 2024-05-03 DIAGNOSIS — Z00.00 WELLNESS EXAMINATION: ICD-10-CM

## 2024-05-03 DIAGNOSIS — K58.0 IRRITABLE BOWEL SYNDROME WITH DIARRHEA: ICD-10-CM

## 2024-05-03 DIAGNOSIS — Z13.0 SCREENING FOR DEFICIENCY ANEMIA: ICD-10-CM

## 2024-05-03 LAB
ALBUMIN SERPL BCP-MCNC: 5 G/DL (ref 3.4–5)
ALP SERPL-CCNC: 60 U/L (ref 33–120)
ALT SERPL W P-5'-P-CCNC: 35 U/L (ref 10–52)
ANION GAP SERPL CALC-SCNC: 14 MMOL/L (ref 10–20)
AST SERPL W P-5'-P-CCNC: 29 U/L (ref 9–39)
BASOPHILS # BLD AUTO: 0.04 X10*3/UL (ref 0–0.1)
BASOPHILS NFR BLD AUTO: 0.5 %
BILIRUB SERPL-MCNC: 0.7 MG/DL (ref 0–1.2)
BUN SERPL-MCNC: 14 MG/DL (ref 6–23)
CALCIUM SERPL-MCNC: 10.1 MG/DL (ref 8.6–10.6)
CHLORIDE SERPL-SCNC: 103 MMOL/L (ref 98–107)
CHOLEST SERPL-MCNC: 251 MG/DL (ref 0–199)
CHOLESTEROL/HDL RATIO: 5.5
CO2 SERPL-SCNC: 27 MMOL/L (ref 21–32)
CREAT SERPL-MCNC: 0.72 MG/DL (ref 0.5–1.3)
EGFRCR SERPLBLD CKD-EPI 2021: >90 ML/MIN/1.73M*2
EOSINOPHIL # BLD AUTO: 0.08 X10*3/UL (ref 0–0.7)
EOSINOPHIL NFR BLD AUTO: 1 %
ERYTHROCYTE [DISTWIDTH] IN BLOOD BY AUTOMATED COUNT: 13.2 % (ref 11.5–14.5)
GLUCOSE SERPL-MCNC: 99 MG/DL (ref 74–99)
HCT VFR BLD AUTO: 50.9 % (ref 41–52)
HDLC SERPL-MCNC: 45.7 MG/DL
HGB BLD-MCNC: 16.6 G/DL (ref 13.5–17.5)
IMM GRANULOCYTES # BLD AUTO: 0.02 X10*3/UL (ref 0–0.7)
IMM GRANULOCYTES NFR BLD AUTO: 0.3 % (ref 0–0.9)
LDLC SERPL CALC-MCNC: 173 MG/DL
LYMPHOCYTES # BLD AUTO: 2.54 X10*3/UL (ref 1.2–4.8)
LYMPHOCYTES NFR BLD AUTO: 32.5 %
MCH RBC QN AUTO: 29.2 PG (ref 26–34)
MCHC RBC AUTO-ENTMCNC: 32.6 G/DL (ref 32–36)
MCV RBC AUTO: 90 FL (ref 80–100)
MONOCYTES # BLD AUTO: 0.43 X10*3/UL (ref 0.1–1)
MONOCYTES NFR BLD AUTO: 5.5 %
NEUTROPHILS # BLD AUTO: 4.71 X10*3/UL (ref 1.2–7.7)
NEUTROPHILS NFR BLD AUTO: 60.2 %
NON HDL CHOLESTEROL: 205 MG/DL (ref 0–149)
NRBC BLD-RTO: 0 /100 WBCS (ref 0–0)
PLATELET # BLD AUTO: 271 X10*3/UL (ref 150–450)
POTASSIUM SERPL-SCNC: 4.3 MMOL/L (ref 3.5–5.3)
PROT SERPL-MCNC: 7.7 G/DL (ref 6.4–8.2)
RBC # BLD AUTO: 5.68 X10*6/UL (ref 4.5–5.9)
SODIUM SERPL-SCNC: 140 MMOL/L (ref 136–145)
TRIGL SERPL-MCNC: 164 MG/DL (ref 0–149)
TSH SERPL-ACNC: 1.06 MIU/L (ref 0.44–3.98)
VLDL: 33 MG/DL (ref 0–40)
WBC # BLD AUTO: 7.8 X10*3/UL (ref 4.4–11.3)

## 2024-05-03 PROCEDURE — 80053 COMPREHEN METABOLIC PANEL: CPT

## 2024-05-03 PROCEDURE — 81001 URINALYSIS AUTO W/SCOPE: CPT

## 2024-05-03 PROCEDURE — 80061 LIPID PANEL: CPT

## 2024-05-03 PROCEDURE — 36415 COLL VENOUS BLD VENIPUNCTURE: CPT

## 2024-05-03 PROCEDURE — 84443 ASSAY THYROID STIM HORMONE: CPT

## 2024-05-03 PROCEDURE — 85025 COMPLETE CBC W/AUTO DIFF WBC: CPT

## 2024-05-04 LAB
APPEARANCE UR: ABNORMAL
BILIRUB UR STRIP.AUTO-MCNC: NEGATIVE MG/DL
COLOR UR: ABNORMAL
GLUCOSE UR STRIP.AUTO-MCNC: NORMAL MG/DL
KETONES UR STRIP.AUTO-MCNC: ABNORMAL MG/DL
LEUKOCYTE ESTERASE UR QL STRIP.AUTO: NEGATIVE
MUCOUS THREADS #/AREA URNS AUTO: NORMAL /LPF
NITRITE UR QL STRIP.AUTO: NEGATIVE
PH UR STRIP.AUTO: 6 [PH]
PROT UR STRIP.AUTO-MCNC: ABNORMAL MG/DL
RBC # UR STRIP.AUTO: NEGATIVE /UL
RBC #/AREA URNS AUTO: NORMAL /HPF
SP GR UR STRIP.AUTO: 1.02
SQUAMOUS #/AREA URNS AUTO: NORMAL /HPF
UROBILINOGEN UR STRIP.AUTO-MCNC: NORMAL MG/DL
WBC #/AREA URNS AUTO: NORMAL /HPF

## 2024-05-19 DIAGNOSIS — K58.9 IRRITABLE BOWEL SYNDROME WITHOUT DIARRHEA: Primary | ICD-10-CM

## 2024-05-19 NOTE — PROGRESS NOTES
Pt was seen at treated at [facility]. Due to our software system/EMR, I was unable to send rx in urgent care visit.     Xifaxin 500mg TID for 14 days as been sent. #42 tablets.

## 2024-05-22 ENCOUNTER — HOSPITAL ENCOUNTER (OUTPATIENT)
Dept: NEUROLOGY | Facility: CLINIC | Age: 39
Discharge: HOME | End: 2024-05-22
Payer: COMMERCIAL

## 2024-05-22 DIAGNOSIS — R00.0 TACHYCARDIA: ICD-10-CM

## 2024-05-22 DIAGNOSIS — R00.2 INTERMITTENT PALPITATIONS: ICD-10-CM

## 2024-05-22 DIAGNOSIS — F41.9 ANXIETY: ICD-10-CM

## 2024-05-22 PROCEDURE — 95923 AUTONOMIC NRV SYST FUNJ TEST: CPT | Performed by: PSYCHIATRY & NEUROLOGY

## 2024-05-22 PROCEDURE — 95924 ANS PARASYMP & SYMP W/TILT: CPT | Performed by: PSYCHIATRY & NEUROLOGY

## 2024-06-03 ENCOUNTER — TELEPHONE (OUTPATIENT)
Dept: OPHTHALMOLOGY | Facility: CLINIC | Age: 39
End: 2024-06-03

## 2024-06-03 ENCOUNTER — OFFICE VISIT (OUTPATIENT)
Dept: PRIMARY CARE | Facility: CLINIC | Age: 39
End: 2024-06-03
Payer: COMMERCIAL

## 2024-06-03 VITALS
SYSTOLIC BLOOD PRESSURE: 134 MMHG | WEIGHT: 198 LBS | HEART RATE: 66 BPM | BODY MASS INDEX: 30.11 KG/M2 | DIASTOLIC BLOOD PRESSURE: 88 MMHG | OXYGEN SATURATION: 97 %

## 2024-06-03 DIAGNOSIS — G90.A POTS (POSTURAL ORTHOSTATIC TACHYCARDIA SYNDROME): Primary | ICD-10-CM

## 2024-06-03 PROCEDURE — 99213 OFFICE O/P EST LOW 20 MIN: CPT | Performed by: STUDENT IN AN ORGANIZED HEALTH CARE EDUCATION/TRAINING PROGRAM

## 2024-06-03 PROCEDURE — 1036F TOBACCO NON-USER: CPT | Performed by: STUDENT IN AN ORGANIZED HEALTH CARE EDUCATION/TRAINING PROGRAM

## 2024-06-03 RX ORDER — CLONAZEPAM 0.5 MG/1
0.5 TABLET ORAL DAILY
COMMUNITY
Start: 2024-05-03

## 2024-06-03 RX ORDER — ESCITALOPRAM OXALATE 10 MG/1
10 TABLET ORAL DAILY
COMMUNITY
Start: 2024-05-21

## 2024-06-03 ASSESSMENT — PAIN SCALES - GENERAL: PAINLEVEL: 1

## 2024-06-03 NOTE — PROGRESS NOTES
Subjective   Patient ID: Freddy Guadalupe is a 38 y.o. male who presents for Abdominal Pain and Results.    HPI comes in for follow-up POTS testing.    Review of Systems  Constitutional: NO F, chills, or sweats  Eyes: no blurred vision or visual disturbance  ENT: no hearing loss, no congestion, no nasal discharge, no hoarseness and no sore throat.   Cardiovascular: Intermittent heart palpitations lightheadedness, he is better on propranolol  Objective   /88 (BP Location: Right arm, Patient Position: Sitting)   Pulse 66   Wt 89.8 kg (198 lb)   SpO2 97%   BMI 30.11 kg/m²     Physical Exam  gen- a & o x 3, nad, pleasant    Assessment/Plan     #1.  Pots disease.  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  Referral to neurology specialist

## 2024-06-07 ENCOUNTER — OFFICE VISIT (OUTPATIENT)
Dept: DERMATOLOGY | Facility: CLINIC | Age: 39
End: 2024-06-07
Payer: COMMERCIAL

## 2024-06-07 DIAGNOSIS — D22.39 FIBROUS PAPULE OF NOSE: ICD-10-CM

## 2024-06-07 DIAGNOSIS — L91.8 SKIN TAG: Primary | ICD-10-CM

## 2024-06-07 PROCEDURE — 99202 OFFICE O/P NEW SF 15 MIN: CPT | Performed by: DERMATOLOGY

## 2024-06-07 PROCEDURE — 1036F TOBACCO NON-USER: CPT | Performed by: DERMATOLOGY

## 2024-06-07 ASSESSMENT — DERMATOLOGY QUALITY OF LIFE (QOL) ASSESSMENT
RATE HOW EMOTIONALLY BOTHERED YOU ARE BY YOUR SKIN PROBLEM (FOR EXAMPLE, WORRY, EMBARRASSMENT, FRUSTRATION): 2
ARE THERE EXCLUSIONS OR EXCEPTIONS FOR THE QUALITY OF LIFE ASSESSMENT: NO
WHAT SINGLE SKIN CONDITION LISTED BELOW IS THE PATIENT ANSWERING THE QUALITY-OF-LIFE ASSESSMENT QUESTIONS ABOUT: NONE OF THE ABOVE
RATE HOW BOTHERED YOU ARE BY EFFECTS OF YOUR SKIN PROBLEMS ON YOUR ACTIVITIES (EG, GOING OUT, ACCOMPLISHING WHAT YOU WANT, WORK ACTIVITIES OR YOUR RELATIONSHIPS WITH OTHERS): 0 - NEVER BOTHERED
RATE HOW BOTHERED YOU ARE BY SYMPTOMS OF YOUR SKIN PROBLEM (EG, ITCHING, STINGING BURNING, HURTING OR SKIN IRRITATION): 2

## 2024-06-07 ASSESSMENT — DERMATOLOGY PATIENT ASSESSMENT
DO YOU USE SUNSCREEN: OCCASIONALLY
ARE YOU AN ORGAN TRANSPLANT RECIPIENT: NO
DO YOU HAVE ANY NEW OR CHANGING LESIONS: YES
DO YOU USE A TANNING BED: NO

## 2024-06-07 ASSESSMENT — PATIENT GLOBAL ASSESSMENT (PGA): PATIENT GLOBAL ASSESSMENT: PATIENT GLOBAL ASSESSMENT:  2 - MILD

## 2024-06-07 ASSESSMENT — ITCH NUMERIC RATING SCALE: HOW SEVERE IS YOUR ITCHING?: 4

## 2024-06-07 NOTE — PROGRESS NOTES
Subjective     Freddy Guadalupe is a 38 y.o. male who presents for the following: Skin Check (Pt has irritated lesions on right upper eyelid, and lesion on left ala-present 6 months. No of skin cancer, fhx of NMSC-Mother.).     Review of Systems:  No other skin or systemic complaints other than what is documented elsewhere in the note.    The following portions of the chart were reviewed this encounter and updated as appropriate:          Skin Cancer History  No skin cancer on file.      Specialty Problems    None       Objective   Well appearing patient in no apparent distress; mood and affect are within normal limits.    A focused skin examination was performed. All findings within normal limits unless otherwise noted below.    Assessment/Plan   1. Skin tag (6)  Left Lower Eyelid, Left Temple, Right Lower Eyelid, Right Malar Cheek, Right Supraorbital Region, Right Upper Eyelid  Fleshy, skin-colored sessile and pedunculated papules.     Benign growths that most commonly occur in areas of friction and rubbing, specifically the neck, axilla, and inguinal creases. No treatment is necessary. If lesions are symptomatic, they can be removed, however regardless of symptoms some insurances may not cover this procedure.    Hyfrecated 6 lesions as courtesy today. The risks and benefits of electrodessication/hyfrecation were reviewed including incomplete removal, crusting, blister hypo and/or hyperpigmentation, scarring and recurrence.      Destr of lesion - Left Lower Eyelid, Left Temple, Right Lower Eyelid, Right Malar Cheek, Right Supraorbital Region, Right Upper Eyelid  Complexity: simple    Destruction method comment:  Electrodessication  Informed consent: discussed and consent obtained    Procedure prep:  Patient prepped in sterile fashion  Outcome: patient tolerated procedure well with no complications    Post-procedure details: wound care instructions given      2. Fibrous papule of nose  Left Ala Nasi  Skin colored to  reddish papule    The benign nature of these skin lesions were reviewed, no treatment is necessary.   Please follow up for any new or pre-existing lesion that is changing in size, shape, color, becomes painful, tender, itches or bleed.        Follow up for next available for FBSE

## 2024-07-11 ENCOUNTER — APPOINTMENT (OUTPATIENT)
Dept: NEUROLOGY | Facility: CLINIC | Age: 39
End: 2024-07-11
Payer: COMMERCIAL

## 2024-07-23 ENCOUNTER — APPOINTMENT (OUTPATIENT)
Dept: DERMATOLOGY | Facility: CLINIC | Age: 39
End: 2024-07-23
Payer: COMMERCIAL

## 2024-07-23 DIAGNOSIS — L30.1 DYSHIDROSIS: ICD-10-CM

## 2024-07-23 DIAGNOSIS — D22.71 MELANOCYTIC NEVI OF RIGHT LOWER LIMB, INCLUDING HIP: ICD-10-CM

## 2024-07-23 DIAGNOSIS — Q87.2: ICD-10-CM

## 2024-07-23 DIAGNOSIS — D22.39 FIBROUS PAPULE OF NOSE: ICD-10-CM

## 2024-07-23 DIAGNOSIS — D22.60 MELANOCYTIC NEVI OF UNSPECIFIED UPPER LIMB, INCLUDING SHOULDER: ICD-10-CM

## 2024-07-23 DIAGNOSIS — Z12.83 ENCOUNTER FOR SCREENING FOR MALIGNANT NEOPLASM OF SKIN: Primary | ICD-10-CM

## 2024-07-23 DIAGNOSIS — L91.8 SKIN TAG: ICD-10-CM

## 2024-07-23 DIAGNOSIS — L57.8 PHOTOAGING OF SKIN: ICD-10-CM

## 2024-07-23 DIAGNOSIS — D22.72 MELANOCYTIC NEVI OF LEFT LOWER EXTREMITY OR HIP: ICD-10-CM

## 2024-07-23 DIAGNOSIS — D22.5 MELANOCYTIC NEVI OF TRUNK: ICD-10-CM

## 2024-07-23 PROCEDURE — 99214 OFFICE O/P EST MOD 30 MIN: CPT | Performed by: DERMATOLOGY

## 2024-07-23 ASSESSMENT — DERMATOLOGY PATIENT ASSESSMENT
DO YOU HAVE ANY NEW OR CHANGING LESIONS: NO
HAVE YOU HAD OR DO YOU HAVE VASCULAR DISEASE: NO
ARE YOU AN ORGAN TRANSPLANT RECIPIENT: NO
HAVE YOU HAD OR DO YOU HAVE A STAPH INFECTION: NO
FOR PATIENTS COMING IN FOR A FOLLOW-UP VISIT - HAVE THERE BEEN ANY CHANGES IN YOUR HEALTH SINCE YOUR LAST VISIT: YES
DO YOU USE A TANNING BED: NO
DO YOU USE SUNSCREEN: OCCASIONALLY

## 2024-07-23 ASSESSMENT — ITCH NUMERIC RATING SCALE: HOW SEVERE IS YOUR ITCHING?: 1

## 2024-07-23 ASSESSMENT — DERMATOLOGY QUALITY OF LIFE (QOL) ASSESSMENT
RATE HOW BOTHERED YOU ARE BY SYMPTOMS OF YOUR SKIN PROBLEM (EG, ITCHING, STINGING BURNING, HURTING OR SKIN IRRITATION): 6 - ALWAYS BOTHERED
RATE HOW BOTHERED YOU ARE BY EFFECTS OF YOUR SKIN PROBLEMS ON YOUR ACTIVITIES (EG, GOING OUT, ACCOMPLISHING WHAT YOU WANT, WORK ACTIVITIES OR YOUR RELATIONSHIPS WITH OTHERS): 0 - NEVER BOTHERED
ARE THERE EXCLUSIONS OR EXCEPTIONS FOR THE QUALITY OF LIFE ASSESSMENT: NO
RATE HOW EMOTIONALLY BOTHERED YOU ARE BY YOUR SKIN PROBLEM (FOR EXAMPLE, WORRY, EMBARRASSMENT, FRUSTRATION): 0 - NEVER BOTHERED
WHAT SINGLE SKIN CONDITION LISTED BELOW IS THE PATIENT ANSWERING THE QUALITY-OF-LIFE ASSESSMENT QUESTIONS ABOUT: NONE OF THE ABOVE

## 2024-07-23 ASSESSMENT — PATIENT GLOBAL ASSESSMENT (PGA): PATIENT GLOBAL ASSESSMENT: PATIENT GLOBAL ASSESSMENT:  2 - MILD

## 2024-07-23 NOTE — PROGRESS NOTES
Subjective     Freddy Guadalupe is a 38 y.o. male who presents for the following: Skin Check (Pt here for FBSE with family hx - NMSC, Mother. Pt reports no areas of concern at this time. ) and Rash (Pt here for rash on bilateral hands for 3 weeks. Pt reports redness, bumps, burning, and itching. Pt has tried Triamcinolone. Pt reports improvement. ).     Review of Systems:  No other skin or systemic complaints other than what is documented elsewhere in the note.    The following portions of the chart were reviewed this encounter and updated as appropriate:         Skin Cancer History  No skin cancer on file.      Specialty Problems    None       Objective   Well appearing patient in no apparent distress; mood and affect are within normal limits.    A full examination was performed including scalp, head, eyes, ears, nose, lips, neck, chest, axillae, abdomen, back, buttocks, bilateral upper extremities, bilateral lower extremities, hands, feet, fingers, toes, fingernails, and toenails. All findings within normal limits unless otherwise noted below.    Assessment/Plan   1. Encounter for screening for malignant neoplasm of skin  No suspicious lesions noted on examination today    The risk of chronic, cumulative sun damage and risk of development of skin cancer was reviewed today.   The importance of sun protection was reviewed: including the use of a broad spectrum sunscreen of at least SPF 30 that protects against both UVA/UVB rays, with ingredients such as Zinc oxide or titanium dioxide, wearing sun protective clothing and sun avoidance. We reviewed the warning signs of non-melanoma skin cancer and ABCDEs of melanoma  Please follow up should you notice any new or changing pre-existing skin lesion.    Related Procedures  Follow Up In Dermatology - Established Patient    2. Photoaging of skin  Mottled pigmentation with telangiectasias and brown reticular macules in sun exposed areas of the body.    The risk of chronic,  cumulative sun damage and risk of development of skin cancer was reviewed today.   The importance of sun protection was reviewed: including the use of a broad spectrum sunscreen of at least SPF 30 that protects against both UVA/UVB rays, with ingredients such as Zinc oxide or titanium dioxide, wearing sun protective clothing and sun avoidance. We reviewed the warning signs of non-melanoma skin cancer and ABCDEs of melanoma  Please follow up should you notice any new or changing pre-existing skin lesion.    Related Procedures  Follow Up In Dermatology - Established Patient    3. Skin tag  Right Antecubital Fossa  Fleshy, skin-colored sessile and pedunculated papules.     Benign growths that most commonly occur in areas of friction and rubbing, specifically the neck, axilla, and inguinal creases. No treatment is necessary. If lesions are symptomatic, they can be removed, however regardless of symptoms some insurances may not cover this procedure.    Hyfrecated 6 lesions as courtesy today. The risks and benefits of electrodessication/hyfrecation were reviewed including incomplete removal, crusting, blister hypo and/or hyperpigmentation, scarring and recurrence.      4. Fibrous papule of nose  Left Ala Nasi  Skin colored to reddish papule    The benign nature of these skin lesions were reviewed, no treatment is necessary.   Please follow up for any new or pre-existing lesion that is changing in size, shape, color, becomes painful, tender, itches or bleed.    5. Melanocytic nevi of unspecified upper limb, including shoulder (2)  Left Arm, Right Arm  Scattered, uniform and benign-appearing, regular brown melanocytic papules and macules.    Clinically benign appearing nevi, no treatment is necessary.  The importance of sun protection was reviewed: including the use of a broad spectrum sunscreen that protects against both UVA/UVB rays, with ingredients such as Zinc oxide or titanium dioxide, wearing sun protective clothing  and sun avoidance.   ABCDEs of melanoma reviewed.  Please follow up should you notice any new or changing pre-existing skin lesion.    6. Melanocytic nevi of trunk (3)  Abdomen (Lower Torso, Anterior), Chest (Upper Torso, Anterior), Torso - Posterior (Back)  Tan-brown symmetric macules and papules    Clinically benign appearing nevi, no treatment is necessary.  The importance of sun protection was reviewed: including the use of a broad spectrum sunscreen that protects against both UVA/UVB rays, with ingredients such as Zinc oxide or titanium dioxide, wearing sun protective clothing and sun avoidance.   ABCDEs of melanoma reviewed.  Please follow up should you notice any new or changing pre-existing skin lesion.    7. Klippel Trenaunay syndrome (HHS-HCC)  Right Leg  Violaceous purpuric patch extending the length of the right lower extremity    Patient with large PWS of the right lower extremity.  Known history of Klippel Trenaunay syndrome diagnosed by outside physicians.  He has been followed by cardiology, follows with [facility]    8. Melanocytic nevi of left lower extremity or hip  Left Leg  Scattered, uniform and benign-appearing, regular brown melanocytic papules and macules.    Clinically benign appearing nevi, no treatment is necessary.  The importance of sun protection was reviewed: including the use of a broad spectrum sunscreen that protects against both UVA/UVB rays, with ingredients such as Zinc oxide or titanium dioxide, wearing sun protective clothing and sun avoidance.   ABCDEs of melanoma reviewed.  Please follow up should you notice any new or changing pre-existing skin lesion.    9. Melanocytic nevi of right lower limb, including hip  Right Leg  Scattered, uniform and benign-appearing, regular brown melanocytic papules and macules.    Clinically benign appearing nevi, no treatment is necessary.  The importance of sun protection was reviewed: including the use of a broad spectrum sunscreen that protects  against both UVA/UVB rays, with ingredients such as Zinc oxide or titanium dioxide, wearing sun protective clothing and sun avoidance.   ABCDEs of melanoma reviewed.  Please follow up should you notice any new or changing pre-existing skin lesion.    10. Dyshidrosis  Left Hand - Anterior, Right Hand - Anterior  Resolving desquamative scaly patches    This is a common eczema (dermatitis) seen most frequently on the hand, however may also affect the feet.  This is most frequently due to chronic wet to dry cycles.  The goal of treatment is to restore the skin barrier and decrease inflammation and itching.  Treatments include topical corticosteroid therapy when the skin is red, itchy and flaky.     To prevent severity and frequency of recurrences a gentle skin care regimen should be followed which includes washing with a fragrance-free soap such as Dove for sensitive skin, Cetaphil or Cerave body washes. After rinsing, pat dry and apply a moisturizer such as Cerave, Cetaphil, or Vanicream. Creams are thicker than lotions and often provde better moisturization than lotions.    Continue triamcinolone 0.1% cream - Patient to apply 2x daily x 2 wks then decrease to 2x/day 2 days per week. Can repeat full 2 week course as often as every 6-8 weeks as needed for flaring. Side effects of topical steroids includes, but is not limited to skin atrophy and dyspigmentation.      Related Medications  triamcinolone (Kenalog) 0.1 % cream  Apply to hands 2x daily x 2 weeks then 2x daily 2 days/wk if needed      Follow up in 1 year for FBSE

## 2024-07-24 RX ORDER — TRIAMCINOLONE ACETONIDE 1 MG/G
CREAM TOPICAL
Qty: 80 G | Refills: 1 | Status: SHIPPED | OUTPATIENT
Start: 2024-07-24

## 2024-08-02 ENCOUNTER — APPOINTMENT (OUTPATIENT)
Dept: GASTROENTEROLOGY | Facility: CLINIC | Age: 39
End: 2024-08-02
Payer: COMMERCIAL

## 2024-08-09 ENCOUNTER — APPOINTMENT (OUTPATIENT)
Dept: GASTROENTEROLOGY | Facility: CLINIC | Age: 39
End: 2024-08-09
Payer: COMMERCIAL

## 2024-09-19 ENCOUNTER — APPOINTMENT (OUTPATIENT)
Dept: NEUROLOGY | Facility: CLINIC | Age: 39
End: 2024-09-19
Payer: COMMERCIAL

## 2024-09-30 DIAGNOSIS — K21.9 GERD WITHOUT ESOPHAGITIS: ICD-10-CM

## 2024-09-30 RX ORDER — OMEPRAZOLE 40 MG/1
CAPSULE, DELAYED RELEASE ORAL
Qty: 90 CAPSULE | Refills: 1 | Status: SHIPPED | OUTPATIENT
Start: 2024-09-30

## 2024-10-17 ENCOUNTER — APPOINTMENT (OUTPATIENT)
Dept: OPHTHALMOLOGY | Facility: CLINIC | Age: 39
End: 2024-10-17
Payer: COMMERCIAL

## 2024-11-08 ENCOUNTER — APPOINTMENT (OUTPATIENT)
Dept: GASTROENTEROLOGY | Facility: CLINIC | Age: 39
End: 2024-11-08
Payer: COMMERCIAL

## 2025-01-03 ENCOUNTER — LAB (OUTPATIENT)
Dept: LAB | Facility: LAB | Age: 40
End: 2025-01-03
Payer: COMMERCIAL

## 2025-01-03 ENCOUNTER — APPOINTMENT (OUTPATIENT)
Dept: GASTROENTEROLOGY | Facility: CLINIC | Age: 40
End: 2025-01-03
Payer: COMMERCIAL

## 2025-01-03 ENCOUNTER — OFFICE VISIT (OUTPATIENT)
Dept: PRIMARY CARE | Facility: CLINIC | Age: 40
End: 2025-01-03
Payer: COMMERCIAL

## 2025-01-03 VITALS — HEIGHT: 68 IN | HEART RATE: 71 BPM | BODY MASS INDEX: 31.67 KG/M2 | OXYGEN SATURATION: 98 % | WEIGHT: 209 LBS

## 2025-01-03 DIAGNOSIS — Z13.1 SCREENING FOR DIABETES MELLITUS: ICD-10-CM

## 2025-01-03 DIAGNOSIS — Z11.59 NEED FOR HEPATITIS C SCREENING TEST: ICD-10-CM

## 2025-01-03 DIAGNOSIS — E78.49 FAMILIAL HYPERLIPIDEMIA: Primary | ICD-10-CM

## 2025-01-03 DIAGNOSIS — F41.9 ANXIETY: ICD-10-CM

## 2025-01-03 DIAGNOSIS — Z77.011 LEAD EXPOSURE: ICD-10-CM

## 2025-01-03 DIAGNOSIS — G90.A POTS (POSTURAL ORTHOSTATIC TACHYCARDIA SYNDROME): ICD-10-CM

## 2025-01-03 DIAGNOSIS — E78.5 HYPERLIPIDEMIA, UNSPECIFIED HYPERLIPIDEMIA TYPE: ICD-10-CM

## 2025-01-03 LAB
EST. AVERAGE GLUCOSE BLD GHB EST-MCNC: 108 MG/DL
HBA1C MFR BLD: 5.4 %
HCV AB SER QL: NONREACTIVE
TSH SERPL-ACNC: 0.93 MIU/L (ref 0.44–3.98)

## 2025-01-03 PROCEDURE — 83036 HEMOGLOBIN GLYCOSYLATED A1C: CPT

## 2025-01-03 PROCEDURE — 99214 OFFICE O/P EST MOD 30 MIN: CPT | Performed by: INTERNAL MEDICINE

## 2025-01-03 PROCEDURE — 3008F BODY MASS INDEX DOCD: CPT | Performed by: INTERNAL MEDICINE

## 2025-01-03 PROCEDURE — 83655 ASSAY OF LEAD: CPT

## 2025-01-03 PROCEDURE — 86803 HEPATITIS C AB TEST: CPT

## 2025-01-03 PROCEDURE — 84443 ASSAY THYROID STIM HORMONE: CPT

## 2025-01-03 PROCEDURE — 1036F TOBACCO NON-USER: CPT | Performed by: INTERNAL MEDICINE

## 2025-01-03 RX ORDER — ROSUVASTATIN CALCIUM 20 MG/1
20 TABLET, COATED ORAL NIGHTLY
Qty: 90 TABLET | Refills: 3 | Status: SHIPPED | OUTPATIENT
Start: 2025-01-03

## 2025-01-03 NOTE — PROGRESS NOTES
"Subjective   Patient ID: Freddy Guadalupe is a 39 y.o. male who presents for Establish Care (New patient here to [facility] care).    HPI   The patient is 39 year old man with PMH significant for postural orthostatic tachycardia syndrome, Klippel-Trenaunay syndrome and Familial Hyperlipidemia who presents to establish medical care.  Prior to diagnosing the POTS, the patient developed anxiety with a frequent and a significant increase in his heart rate while standing. At present in well hydrated state the symptoms are absent, \"I only feel my heart rate going up if dehydrated\".  Lexapro was prescribed and it is effective.  Currently managing his symptoms with a healthy life style and hydration.  The patient follows up with a Vascular medicine expert at the [facility] for KTS.  Hyperlipidemia is treated with the Pravastatin 40 mg daily, not effective.  Review of Systems  Negative  Objective   Pulse 71   Ht 1.727 m (5' 8\")   Wt 94.8 kg (209 lb)   SpO2 98%   BMI 31.78 kg/m²     Physical Exam  NAD. Cooperative.  Neck: WNL  Lungs CTA  Heart: RRR  Abdomen: WNL    Assessment/Plan   Diagnoses and all orders for this visit:  Familial hyperlipidemia  -     TSH; Future  -     rosuvastatin (Crestor) 20 mg tablet; Take 1 tablet (20 mg) by mouth once daily at bedtime.  -     CT cardiac scoring wo IV contrast; Future  Anxiety  Lead exposure  -     Lead, blood; Future  POTS (postural orthostatic tachycardia syndrome)  Need for hepatitis C screening test  -     Hepatitis C Antibody; Future  Screening for diabetes mellitus  -     Hemoglobin A1C; Future  Discussed health benefits from whole food plant based diet. Bibliotherapy: The [place] Study, GENE Atkins, PhD, suggested  Reviewed medical records from 8/21 to 5/24, discussed with the patient.  The lipids are not at optimum goal, recommended discontinuation of Pravastatin, Rosuvastatin is prescribed instead, lipids in 3 months.         "

## 2025-01-06 LAB
LEAD BLD-MCNC: 0.6 UG/DL
LEAD BLDV-MCNC: NORMAL UG/DL

## 2025-02-03 ENCOUNTER — TELEPHONE (OUTPATIENT)
Dept: PRIMARY CARE | Facility: CLINIC | Age: 40
End: 2025-02-03
Payer: COMMERCIAL

## 2025-02-03 DIAGNOSIS — F41.9 ANXIETY: ICD-10-CM

## 2025-02-03 RX ORDER — PROPRANOLOL HYDROCHLORIDE 20 MG/1
20 TABLET ORAL 2 TIMES DAILY
Qty: 180 TABLET | Refills: 3 | Status: SHIPPED | OUTPATIENT
Start: 2025-02-03 | End: 2025-02-03 | Stop reason: DRUGHIGH

## 2025-02-03 RX ORDER — PROPRANOLOL HYDROCHLORIDE 40 MG/1
40 TABLET ORAL 2 TIMES DAILY
Qty: 180 TABLET | Refills: 3 | Status: SHIPPED | OUTPATIENT
Start: 2025-02-03

## 2025-03-24 DIAGNOSIS — K21.9 GERD WITHOUT ESOPHAGITIS: ICD-10-CM

## 2025-03-24 RX ORDER — OMEPRAZOLE 40 MG/1
CAPSULE, DELAYED RELEASE ORAL
Qty: 90 CAPSULE | Refills: 3 | Status: SHIPPED | OUTPATIENT
Start: 2025-03-24

## 2025-04-04 ENCOUNTER — APPOINTMENT (OUTPATIENT)
Dept: GASTROENTEROLOGY | Facility: CLINIC | Age: 40
End: 2025-04-04
Payer: COMMERCIAL

## 2025-04-15 ENCOUNTER — OFFICE VISIT (OUTPATIENT)
Dept: URGENT CARE | Age: 40
End: 2025-04-15
Payer: COMMERCIAL

## 2025-04-15 VITALS
DIASTOLIC BLOOD PRESSURE: 86 MMHG | RESPIRATION RATE: 20 BRPM | OXYGEN SATURATION: 100 % | SYSTOLIC BLOOD PRESSURE: 142 MMHG | BODY MASS INDEX: 28.79 KG/M2 | HEIGHT: 68 IN | WEIGHT: 190 LBS | TEMPERATURE: 98.6 F | HEART RATE: 84 BPM

## 2025-04-15 DIAGNOSIS — L73.9 FOLLICULITIS: Primary | ICD-10-CM

## 2025-04-15 PROCEDURE — 99213 OFFICE O/P EST LOW 20 MIN: CPT | Performed by: STUDENT IN AN ORGANIZED HEALTH CARE EDUCATION/TRAINING PROGRAM

## 2025-04-15 PROCEDURE — 3008F BODY MASS INDEX DOCD: CPT | Performed by: STUDENT IN AN ORGANIZED HEALTH CARE EDUCATION/TRAINING PROGRAM

## 2025-04-15 PROCEDURE — 1036F TOBACCO NON-USER: CPT | Performed by: STUDENT IN AN ORGANIZED HEALTH CARE EDUCATION/TRAINING PROGRAM

## 2025-04-15 RX ORDER — MUPIROCIN 20 MG/G
OINTMENT TOPICAL 3 TIMES DAILY
Qty: 22 G | Refills: 0 | Status: SHIPPED | OUTPATIENT
Start: 2025-04-15 | End: 2025-04-22

## 2025-04-15 RX ORDER — SULFAMETHOXAZOLE AND TRIMETHOPRIM 800; 160 MG/1; MG/1
1 TABLET ORAL 2 TIMES DAILY
Qty: 10 TABLET | Refills: 0 | Status: SHIPPED | OUTPATIENT
Start: 2025-04-15 | End: 2025-04-20

## 2025-04-15 ASSESSMENT — ENCOUNTER SYMPTOMS
DEPRESSION: 0
WOUND: 1
LOSS OF SENSATION IN FEET: 0
OCCASIONAL FEELINGS OF UNSTEADINESS: 0

## 2025-04-15 NOTE — PROGRESS NOTES
Subjective   Patient ID: Freddy Guadalupe is a 39 y.o. male. They present today with a chief complaint of Infection (RT hip, raised bump, x 1 week. Achy. ).    History of Present Illness  Pt presents with possible skin infection to the R groin region x 1 week. States area is itchy. Tried mupirocin and Hibiclens with some relief. States the last few days, pain started to worsen again. The overall appearance of the area is much better than it was initially. He states it is possible that he was bit by a spider because he lives in a [address] yr old house. Denies drainage, fever/chills, other constitutional sx. 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     Klippel Trenaunay syndrome    Diverticulitis of intestine, part unspecified, without perforation or abscess without bleeding 04/15/2022    Acute diverticulitis    Keratoconus, unspecified, unspecified eye 11/10/2020    Keratoconus, unspecified laterality    Migraine with aura, not intractable, without status migrainosus 11/10/2020    Migraine with aura and without status migrainosus, not intractable    Noninfective gastroenteritis and colitis, unspecified 05/11/2022    Diarrhea, secretory    Personal history of other diseases of the digestive system 05/11/2022    History of hemorrhoids    Personal history of other specified conditions 05/12/2022    History of diarrhea    Personal history of other specified conditions 04/15/2022    History of abdominal pain    Personal history of other specified conditions 03/31/2021    History of tachycardia       Past Surgical History:   Procedure  "Laterality Date    CORNEAL TRANSPLANT      OTHER SURGICAL HISTORY  11/16/2020    Pyloromyotomy        reports that he has never smoked. He has never used smokeless tobacco. He reports that he does not currently use alcohol. He reports that he does not use drugs.    Review of Systems  Review of Systems   Skin:  Positive for wound.   All other systems reviewed and are negative.                                 Objective    Vitals:    04/15/25 0821   BP: 142/86   BP Location: Left arm   Patient Position: Sitting   BP Cuff Size: Adult   Pulse: 84   Resp: 20   Temp: 37 °C (98.6 °F)   TempSrc: Oral   SpO2: 100%   Weight: 86.2 kg (190 lb)   Height: 1.727 m (5' 8\")     No LMP for male patient.    Physical Exam  Vitals and nursing note reviewed.   Constitutional:       General: He is not in acute distress.     Appearance: Normal appearance. He is not ill-appearing, toxic-appearing or diaphoretic.   Skin:            Comments: Round, superficial area of redness, induration and mild ttp surrounding hair follicle. No drainage. Central scabbing. No streaking, warmth. Consistent with folliculitis. No bite marks noted.    Neurological:      Mental Status: He is alert.         Procedures    Point of Care Test & Imaging Results from this visit  No results found for this visit on 04/15/25.   Imaging  No results found.    Cardiology, Vascular, and Other Imaging  No other imaging results found for the past 2 days      Diagnostic study results (if any) were reviewed by Sophy Zavaleta PA-C.    Assessment/Plan   Allergies, medications, history, and pertinent labs/EKGs/Imaging reviewed by Sophy Zavaleta PA-C.     Medical Decision Making  Advised warm compresses 2-3 times daily  Not amenable to I&D at this time  Seek re-evaluation for worsening, new or non improving symptoms     Orders and Diagnoses  Diagnoses and all orders for this visit:  Folliculitis  -     sulfamethoxazole-trimethoprim (Bactrim DS) 800-160 mg tablet; Take 1 tablet " by mouth 2 times a day for 5 days.  -     mupirocin (Bactroban) 2 % ointment; Apply topically 3 times a day for 7 days.      Medical Admin Record      Patient disposition: Home    Electronically signed by Sophy Zavaleta PA-C  9:02 AM

## 2025-04-15 NOTE — PATIENT INSTRUCTIONS
Please monitor closely for signs of infection including but not limited to: fever (temperature of 100.4 or greater), chills, worsening redness, swelling, pain, or drainage and see your primary provider, go to the emergency room, or return promptly to the urgent care if these develop.    I advise warm compresses to affected area twice-three times daily for 10-15 minutes

## 2025-05-06 ENCOUNTER — APPOINTMENT (OUTPATIENT)
Dept: OPHTHALMOLOGY | Facility: CLINIC | Age: 40
End: 2025-05-06
Payer: COMMERCIAL

## 2025-05-06 DIAGNOSIS — H52.213 IRREGULAR ASTIGMATISM OF BOTH EYES: Primary | ICD-10-CM

## 2025-05-06 DIAGNOSIS — H18.611 KERATOCONUS, STABLE, RIGHT EYE: ICD-10-CM

## 2025-05-06 DIAGNOSIS — H18.622 KERATOCONUS, UNSTABLE, LEFT EYE: ICD-10-CM

## 2025-05-06 DIAGNOSIS — H52.02 HYPERMETROPIA OF LEFT EYE: ICD-10-CM

## 2025-05-06 DIAGNOSIS — H52.11 MYOPIA OF RIGHT EYE: ICD-10-CM

## 2025-05-06 DIAGNOSIS — H10.13 ALLERGIC CONJUNCTIVITIS OF BOTH EYES: ICD-10-CM

## 2025-05-06 LAB
CENTRAL CORNEA THICKNESS (OD): 526 MICRONS
CENTRAL CORNEA THICKNESS (OS): 500 MICRONS
KMAX (OD): 47.8 DIOPTERS
PACH THINNEST (OD): 517 MICRONS
PACH THINNEST (OS): 483 MICRONS
POSTERIOR FLOAT (OD): 12 MICRONS
POSTERIOR FLOAT (OS): 75 MICRONS
SIMK FLAT (OD): 42.7 DIOPTERS
SIMK FLAT (OS): 38 DIOPTERS
SIMK STEEP (OD): 44.7 DIOPTERS
SIMK STEEP (OS): 40.8 DIOPTERS
SIMK STEEP AXIS (OD): 100.4 DEGREES
SIMK STEEP AXIS (OS): 32.5 DEGREES

## 2025-05-06 PROCEDURE — 92014 COMPRE OPH EXAM EST PT 1/>: CPT | Performed by: OPTOMETRIST

## 2025-05-06 PROCEDURE — 92015 DETERMINE REFRACTIVE STATE: CPT | Performed by: OPTOMETRIST

## 2025-05-06 PROCEDURE — 92025 CPTRIZED CORNEAL TOPOGRAPHY: CPT | Performed by: OPTOMETRIST

## 2025-05-06 RX ORDER — OLOPATADINE HYDROCHLORIDE 2 MG/ML
1 SOLUTION OPHTHALMIC DAILY
Qty: 5 ML | Refills: 5 | Status: SHIPPED | OUTPATIENT
Start: 2025-05-06 | End: 2025-11-02

## 2025-05-06 ASSESSMENT — REFRACTION_CURRENTRX
OD_BRAND: BIOFINITY ENERGYS
OS_SPHERE: +0.50
OS_DIAMETER: 14.5
OS_CYLINDER: -1.75
OD_SPHERE: -3.00
OS_BASECURVE: 8.7
OD_DIAMETER: 14.0
OS_AXIS: 120
OS_BRAND: BIOFINITY TORIC
OD_BASECURVE: 8.6

## 2025-05-06 ASSESSMENT — CONF VISUAL FIELD
OS_INFERIOR_TEMPORAL_RESTRICTION: 0
OD_INFERIOR_NASAL_RESTRICTION: 0
METHOD: COUNTING FINGERS
OS_SUPERIOR_TEMPORAL_RESTRICTION: 0
OS_NORMAL: 1
OD_SUPERIOR_NASAL_RESTRICTION: 0
OS_SUPERIOR_NASAL_RESTRICTION: 0
OS_INFERIOR_NASAL_RESTRICTION: 0
OD_SUPERIOR_TEMPORAL_RESTRICTION: 0
OD_NORMAL: 1
OD_INFERIOR_TEMPORAL_RESTRICTION: 0

## 2025-05-06 ASSESSMENT — REFRACTION
OS_CYLINDER: -2.00
OD_AXIS: 155
OD_CYLINDER: -0.50
OS_AXIS: 115
OD_SPHERE: -2.75
OS_SPHERE: +0.75

## 2025-05-06 ASSESSMENT — VISUAL ACUITY
OS_CC: J1
VA_OR_OS_CURRENT_RX: 20/25
OD_CC: J1+
METHOD: SNELLEN - LINEAR
CORRECTION_TYPE: CONTACTS
VA_OR_OD_CURRENT_RX: 20/20
OD_CC: 20/20
OS_CC: 20/25

## 2025-05-06 ASSESSMENT — ENCOUNTER SYMPTOMS
PSYCHIATRIC NEGATIVE: 0
ALLERGIC/IMMUNOLOGIC NEGATIVE: 0
NEUROLOGICAL NEGATIVE: 0
MUSCULOSKELETAL NEGATIVE: 0
ENDOCRINE NEGATIVE: 0
GASTROINTESTINAL NEGATIVE: 0
CARDIOVASCULAR NEGATIVE: 0
CONSTITUTIONAL NEGATIVE: 0
RESPIRATORY NEGATIVE: 0
HEMATOLOGIC/LYMPHATIC NEGATIVE: 0
EYES NEGATIVE: 1

## 2025-05-06 ASSESSMENT — REFRACTION_WEARINGRX
OD_AXIS: 165
OD_CYLINDER: -0.50
OD_SPHERE: -2.75
OS_AXIS: 124
OS_SPHERE: +0.50
OS_CYLINDER: -2.00

## 2025-05-06 ASSESSMENT — REFRACTION_MANIFEST
OS_CYLINDER: -2.00
OD_AXIS: 155
OD_SPHERE: -3.00
OS_SPHERE: -0.25
OS_AXIS: 110
OD_CYLINDER: -0.75

## 2025-05-06 ASSESSMENT — TONOMETRY
OD_IOP_MMHG: 10
IOP_METHOD: GOLDMANN APPLANATION
OS_IOP_MMHG: 10

## 2025-05-06 ASSESSMENT — SLIT LAMP EXAM - LIDS: COMMENTS: NORMAL

## 2025-05-06 ASSESSMENT — CUP TO DISC RATIO
OD_RATIO: .2
OS_RATIO: .2

## 2025-05-06 ASSESSMENT — EXTERNAL EXAM - LEFT EYE: OS_EXAM: NORMAL

## 2025-05-06 ASSESSMENT — EXTERNAL EXAM - RIGHT EYE: OD_EXAM: NORMAL

## 2025-05-06 NOTE — Clinical Note
Both eyes (OU) Contact Lens Order Contact Lens Current Rx    Current Contact Lens Rx      Brand Base Curve Diameter Sphere Cylinder Axis Dist VA Over-Sphere  Over-Dist VA   Right Biofinity Energys 8.6 14.0 -3.00     Left Biofinity Toric 8.7 14.5 +0.50 -1.75 120        Quantity: 4 Package: 6 PK Appointment needed? No Medically necessary? Yes Ship To: Home Additional instructions: Please order year supply plus a few trials extra and mail to patient. Med nec, ABN signed, eyemed DNB, charges in epic. Thanks!

## 2025-05-06 NOTE — PROGRESS NOTES
Assessment/Plan   Diagnoses and all orders for this visit:  Irregular astigmatism of both eyes  -     Corneal Topography - OU - Both Eyes  Keratoconus, unstable, left eye  Keratoconus, stable, right eye  Myopia of right eye  Hypermetropia of left eye  Discussed proper wear, care, replacement of contact lenses. Gave handout. D/c cl wear and RTC if eyes become red, painful, irritated. Monitor 1 year.   CL eval billed today as medically necessary ($35) w/ cost of lenses. Will mail to patient.   New spec rx released today per patient request. Ocular health wnl for age OU. Monitor 1 year or sooner prn. Refraction billed today. Pt consents to receiving glasses Rx today. Patient's/guardian's signature obtained to acknowledge and confirm that a paper copy of glasses Rx was given to patient in compliance with [facility] Eyeglass Rule. Electronic copy of Rx will also be available via [employer]/EPIC. Recommend do not fill spec RX as best correct is in contact lenses.     Allergic conjunctivitis of both eyes  -     olopatadine (Pataday) 0.2 % ophthalmic solution; Administer 1 drop into both eyes once daily.  Recommend alaway/zaditor/pataday/lastacaft gtts qday-bid OU. Consider steroid drops (gtts) if worsening. Recommend cold compresses, cold ATs for comfort. No eye rubbing. Recommend addition of oral allergy med to help with control. Monitor prn.

## 2025-05-30 ENCOUNTER — APPOINTMENT (OUTPATIENT)
Dept: GASTROENTEROLOGY | Facility: CLINIC | Age: 40
End: 2025-05-30
Payer: COMMERCIAL

## 2025-07-14 DIAGNOSIS — G43.801 OTHER MIGRAINE WITH STATUS MIGRAINOSUS, NOT INTRACTABLE: Primary | ICD-10-CM

## 2025-07-15 RX ORDER — SUMATRIPTAN SUCCINATE 50 MG/1
50 TABLET ORAL ONCE AS NEEDED
Qty: 9 TABLET | Refills: 1 | Status: SHIPPED | OUTPATIENT
Start: 2025-07-15

## 2025-07-29 ENCOUNTER — APPOINTMENT (OUTPATIENT)
Dept: DERMATOLOGY | Facility: CLINIC | Age: 40
End: 2025-07-29
Payer: COMMERCIAL

## 2025-07-29 DIAGNOSIS — Q87.2: ICD-10-CM

## 2025-07-29 DIAGNOSIS — L91.8 SKIN TAG: ICD-10-CM

## 2025-07-29 DIAGNOSIS — D18.01 HEMANGIOMA OF SKIN: ICD-10-CM

## 2025-07-29 DIAGNOSIS — D22.71 MELANOCYTIC NEVI OF RIGHT LOWER LIMB, INCLUDING HIP: ICD-10-CM

## 2025-07-29 DIAGNOSIS — D22.60 MELANOCYTIC NEVI OF UNSPECIFIED UPPER LIMB, INCLUDING SHOULDER: ICD-10-CM

## 2025-07-29 DIAGNOSIS — D48.5 NEOPLASM OF UNCERTAIN BEHAVIOR OF SKIN: ICD-10-CM

## 2025-07-29 DIAGNOSIS — Z12.83 ENCOUNTER FOR SCREENING FOR MALIGNANT NEOPLASM OF SKIN: ICD-10-CM

## 2025-07-29 DIAGNOSIS — D22.5 MELANOCYTIC NEVI OF TRUNK: ICD-10-CM

## 2025-07-29 DIAGNOSIS — D22.72 MELANOCYTIC NEVI OF LEFT LOWER EXTREMITY OR HIP: ICD-10-CM

## 2025-07-29 DIAGNOSIS — L57.8 PHOTOAGING OF SKIN: Primary | ICD-10-CM

## 2025-07-29 PROCEDURE — 99213 OFFICE O/P EST LOW 20 MIN: CPT | Performed by: DERMATOLOGY

## 2025-07-29 PROCEDURE — 11102 TANGNTL BX SKIN SINGLE LES: CPT

## 2025-07-29 RX ORDER — CITALOPRAM 10 MG/1
10 TABLET ORAL NIGHTLY
COMMUNITY
Start: 2025-07-15

## 2025-07-29 NOTE — Clinical Note
Benign growths that most commonly occur in areas of friction and rubbing, specifically the neck, axilla, and inguinal creases. No treatment is necessary. If lesions are symptomatic, they can be removed, however regardless of symptoms some insurances may not cover this procedure.    Hyfrecated 4 tags today as a courtesy to the patient.

## 2025-07-29 NOTE — Clinical Note
Lesion concerning for fibrous papule vs BCC. The need for biopsy for definitive diagnosis recommended. Risks and benefits reviewed, see procedure note.

## 2025-07-29 NOTE — PROGRESS NOTES
Subjective     Freddy Guadalupe is a 40 y.o. male who presents for the following: Skin Check (Annual - LV 07/23/24 - Patient has a history of: Dyshidrosis, Klippel Trenaunay syndrome  Area(s) of concern: left ala nasi its been bleeding, scabbing, 1/10 pain.  Previously diagnosed as Fibrous papule of nose. Multiple skin tags that are bothersome and itching/).          Review of Systems:  No other skin or systemic complaints other than what is documented elsewhere in the note.    The following portions of the chart were reviewed this encounter and updated as appropriate:         Skin Cancer History  Biopsy Log Book  No skin cancers from Specimen Tracking.    Additional History      Specialty Problems    None       Objective   Well appearing patient in no apparent distress; mood and affect are within normal limits.    A full examination was performed including scalp, head, eyes, ears, nose, lips, neck, chest, axillae, abdomen, back, buttocks, bilateral upper extremities, bilateral lower extremities, hands, feet, fingers, toes, fingernails, and toenails. All findings within normal limits unless otherwise noted below.    Assessment/Plan   Skin Exam  1. PHOTOAGING OF SKIN  Generalized  Mottled pigmentation with telangiectasias and brown reticular macules in sun exposed areas of the body.  The risk of chronic, cumulative sun damage and risk of development of skin cancer was reviewed today.   The importance of sun protection was reviewed: including the use of a broad spectrum sunscreen of at least SPF 30 that protects against both UVA/UVB rays, with ingredients such as Zinc oxide or titanium dioxide, wearing sun protective clothing and sun avoidance. We reviewed the warning signs of non-melanoma skin cancer and ABCDEs of melanoma  Please follow up should you notice any new or changing pre-existing skin lesion.  This Visit  - Follow Up In Dermatology - Established Patient  - Follow Up In Dermatology - Established Patient  2.  NEOPLASM OF UNCERTAIN BEHAVIOR OF SKIN  Left Ala Nasi  Clear 4mm papule with telangiectatic vessels    - Lesion biopsy  Type of biopsy: tangential    Informed consent: discussed and consent obtained    Timeout: patient name, date of birth, surgical site, and procedure verified    Procedure prep:  Patient was prepped and draped  Anesthesia: the lesion was anesthetized in a standard fashion    Anesthetic:  1% lidocaine w/ epinephrine 1-100,000 local infiltration  Instrument used: DermaBlade    Hemostasis achieved with: aluminum chloride    Outcome: patient tolerated procedure well    Post-procedure details: sterile dressing applied and wound care instructions given    Dressing type: petrolatum and bandage      Specimen 1 - Dermatopathology- DERM LAB  Differential Diagnosis: fibrous papule vs BCC  Check Margins Yes/No?:  yes  Comments:    Dermpath Lab: Routine Histopathology (formalin-fixed tissue)  Lesion concerning for fibrous papule vs BCC. The need for biopsy for definitive diagnosis recommended. Risks and benefits reviewed, see procedure note.  3. SKIN TAG  Right Antecubital Fossa  Fleshy, skin-colored sessile and pedunculated papules.   Benign growths that most commonly occur in areas of friction and rubbing, specifically the neck, axilla, and inguinal creases. No treatment is necessary. If lesions are symptomatic, they can be removed, however regardless of symptoms some insurances may not cover this procedure.    Hyfrecated 4 tags today as a courtesy to the patient.    4. MELANOCYTIC NEVI OF UNSPECIFIED UPPER LIMB, INCLUDING SHOULDER (2)  Left Arm, Right Arm  Scattered, uniform and benign-appearing, regular brown melanocytic papules and macules.  Clinically benign appearing nevi, no treatment is necessary.  The importance of sun protection was reviewed: including the use of a broad spectrum sunscreen that protects against both UVA/UVB rays, with ingredients such as Zinc oxide or titanium dioxide, wearing sun  protective clothing and sun avoidance.   ABCDEs of melanoma reviewed.  Please follow up should you notice any new or changing pre-existing skin lesion.  5. MELANOCYTIC NEVI OF TRUNK (3)  Abdomen (Lower Torso, Anterior), Chest (Upper Torso, Anterior), Torso - Posterior (Back)  Tan-brown symmetric macules and papules  Clinically benign appearing nevi, no treatment is necessary.  The importance of sun protection was reviewed: including the use of a broad spectrum sunscreen that protects against both UVA/UVB rays, with ingredients such as Zinc oxide or titanium dioxide, wearing sun protective clothing and sun avoidance.   ABCDEs of melanoma reviewed.  Please follow up should you notice any new or changing pre-existing skin lesion.  6. MELANOCYTIC NEVI OF LEFT LOWER EXTREMITY OR HIP  Left Leg  Scattered, uniform and benign-appearing, regular brown melanocytic papules and macules.  Clinically benign appearing nevi, no treatment is necessary.  The importance of sun protection was reviewed: including the use of a broad spectrum sunscreen that protects against both UVA/UVB rays, with ingredients such as Zinc oxide or titanium dioxide, wearing sun protective clothing and sun avoidance.   ABCDEs of melanoma reviewed.  Please follow up should you notice any new or changing pre-existing skin lesion.  7. MELANOCYTIC NEVI OF RIGHT LOWER LIMB, INCLUDING HIP  Right Leg  Scattered, uniform and benign-appearing, regular brown melanocytic papules and macules.  Clinically benign appearing nevi, no treatment is necessary.  The importance of sun protection was reviewed: including the use of a broad spectrum sunscreen that protects against both UVA/UVB rays, with ingredients such as Zinc oxide or titanium dioxide, wearing sun protective clothing and sun avoidance.   ABCDEs of melanoma reviewed.  Please follow up should you notice any new or changing pre-existing skin lesion.  8. HEMANGIOMA OF SKIN (2)  Right Occipital Scalp, Trunk  Cherry  red papules  The benign nature of these skin lesions were reviewed, no treatment is necessary.   Please follow up for any new or pre-existing lesion that is changing in size, shape, color, becomes painful, tender, itches or bleed.  9. KLIPPEL TRENAUNAY SYNDROME ([facility])  Right Leg  Violaceous purpuric patch extending the length of the right lower extremity  Patient with large PWS of the right lower extremity.  Known history of Klippel Trenaunay syndrome diagnosed by outside physicians.  He has been followed by cardiology, follows with [facility]  10. ENCOUNTER FOR SCREENING FOR MALIGNANT NEOPLASM OF SKIN  Generalized  Lesion of concern on the left nasal ala (see NUB)  The risk of chronic, cumulative sun damage and risk of development of skin cancer was reviewed today.   The importance of sun protection was reviewed: including the use of a broad spectrum sunscreen of at least SPF 30 that protects against both UVA/UVB rays, with ingredients such as Zinc oxide or titanium dioxide, wearing sun protective clothing and sun avoidance. We reviewed the warning signs of non-melanoma skin cancer and ABCDEs of melanoma  Please follow up should you notice any new or changing pre-existing skin lesion.  This Visit  - Follow Up In Dermatology - Established Patient    RTC in 1 year for FBSE.    Cassandra Escalante MD, ZEB  PGY-4, Department of Dermatology    I was present for the entirety of the procedure(s).  I performed the procedure. I saw and evaluated the patient. I personally obtained the key and critical portions of the history and physical exam or was physically present for key and critical portions performed by the resident/fellow. I reviewed the resident/fellow's documentation and discussed the patient with the resident/fellow. I agree with the resident/fellow's medical decision making as documented in the note.    Olga Morales DO

## 2025-07-29 NOTE — Clinical Note
Patient with large PWS of the right lower extremity.  Known history of Klippel Trenaunay syndrome diagnosed by outside physicians.  He has been followed by cardiology, follows with [facility]

## 2025-07-31 ENCOUNTER — RESULTS FOLLOW-UP (OUTPATIENT)
Dept: DERMATOLOGY | Facility: CLINIC | Age: 40
End: 2025-07-31
Payer: COMMERCIAL

## 2025-07-31 DIAGNOSIS — C44.311 BASAL CELL CARCINOMA (BCC) OF LEFT SIDE OF NOSE: Primary | ICD-10-CM

## 2025-07-31 LAB
LABORATORY COMMENT REPORT: NORMAL
PATH REPORT.FINAL DX SPEC: NORMAL
PATH REPORT.GROSS SPEC: NORMAL
PATH REPORT.MICROSCOPIC SPEC OTHER STN: NORMAL
PATH REPORT.RELEVANT HX SPEC: NORMAL
PATH REPORT.TOTAL CANCER: NORMAL

## 2025-08-27 ENCOUNTER — APPOINTMENT (OUTPATIENT)
Dept: DERMATOLOGY | Facility: CLINIC | Age: 40
End: 2025-08-27
Payer: COMMERCIAL

## 2025-09-05 ENCOUNTER — APPOINTMENT (OUTPATIENT)
Dept: GASTROENTEROLOGY | Facility: CLINIC | Age: 40
End: 2025-09-05
Payer: COMMERCIAL

## 2026-05-11 ENCOUNTER — APPOINTMENT (OUTPATIENT)
Dept: OPHTHALMOLOGY | Facility: CLINIC | Age: 41
End: 2026-05-11
Payer: COMMERCIAL

## 2026-08-11 ENCOUNTER — APPOINTMENT (OUTPATIENT)
Dept: DERMATOLOGY | Facility: CLINIC | Age: 41
End: 2026-08-11
Payer: COMMERCIAL